# Patient Record
Sex: FEMALE | Race: BLACK OR AFRICAN AMERICAN | Employment: UNEMPLOYED | URBAN - METROPOLITAN AREA
[De-identification: names, ages, dates, MRNs, and addresses within clinical notes are randomized per-mention and may not be internally consistent; named-entity substitution may affect disease eponyms.]

---

## 2017-04-03 ENCOUNTER — HOSPITAL ENCOUNTER (EMERGENCY)
Age: 52
Discharge: HOME | End: 2017-04-03
Attending: EMERGENCY MEDICINE
Payer: SELF-PAY

## 2017-04-03 ENCOUNTER — APPOINTMENT (OUTPATIENT)
Dept: GENERAL RADIOLOGY | Age: 52
End: 2017-04-03
Attending: EMERGENCY MEDICINE
Payer: SELF-PAY

## 2017-04-03 VITALS
RESPIRATION RATE: 16 BRPM | BODY MASS INDEX: 33.29 KG/M2 | WEIGHT: 195 LBS | OXYGEN SATURATION: 97 % | DIASTOLIC BLOOD PRESSURE: 69 MMHG | HEIGHT: 64 IN | HEART RATE: 75 BPM | SYSTOLIC BLOOD PRESSURE: 113 MMHG | TEMPERATURE: 98.1 F

## 2017-04-03 DIAGNOSIS — R19.7 DIARRHEA, UNSPECIFIED TYPE: ICD-10-CM

## 2017-04-03 DIAGNOSIS — R10.13 ABDOMINAL PAIN, EPIGASTRIC: Primary | ICD-10-CM

## 2017-04-03 DIAGNOSIS — R11.2 NON-INTRACTABLE VOMITING WITH NAUSEA, UNSPECIFIED VOMITING TYPE: ICD-10-CM

## 2017-04-03 LAB
ALBUMIN SERPL BCP-MCNC: 3.4 G/DL (ref 3.4–5)
ALBUMIN/GLOB SERPL: 0.9 {RATIO} (ref 0.8–1.7)
ALP SERPL-CCNC: 68 U/L (ref 45–117)
ALT SERPL-CCNC: 11 U/L (ref 13–56)
ANION GAP BLD CALC-SCNC: 6 MMOL/L (ref 3–18)
AST SERPL W P-5'-P-CCNC: 6 U/L (ref 15–37)
ATRIAL RATE: 81 BPM
BASOPHILS # BLD AUTO: 0 K/UL (ref 0–0.1)
BASOPHILS # BLD: 0 % (ref 0–2)
BILIRUB SERPL-MCNC: 0.4 MG/DL (ref 0.2–1)
BUN SERPL-MCNC: 6 MG/DL (ref 7–18)
BUN/CREAT SERPL: 10 (ref 12–20)
CALCIUM SERPL-MCNC: 8.6 MG/DL (ref 8.5–10.1)
CALCULATED P AXIS, ECG09: 50 DEGREES
CALCULATED R AXIS, ECG10: 29 DEGREES
CALCULATED T AXIS, ECG11: 33 DEGREES
CHLORIDE SERPL-SCNC: 110 MMOL/L (ref 100–108)
CK MB CFR SERPL CALC: NORMAL % (ref 0–4)
CK MB SERPL-MCNC: <1 NG/ML (ref 5–25)
CK SERPL-CCNC: 50 U/L (ref 26–192)
CO2 SERPL-SCNC: 26 MMOL/L (ref 21–32)
CREAT SERPL-MCNC: 0.62 MG/DL (ref 0.6–1.3)
DIAGNOSIS, 93000: NORMAL
DIFFERENTIAL METHOD BLD: ABNORMAL
EOSINOPHIL # BLD: 0.1 K/UL (ref 0–0.4)
EOSINOPHIL NFR BLD: 1 % (ref 0–5)
ERYTHROCYTE [DISTWIDTH] IN BLOOD BY AUTOMATED COUNT: 14.5 % (ref 11.6–14.5)
GLOBULIN SER CALC-MCNC: 3.8 G/DL (ref 2–4)
GLUCOSE SERPL-MCNC: 111 MG/DL (ref 74–99)
HCT VFR BLD AUTO: 40.3 % (ref 35–45)
HGB BLD-MCNC: 13.4 G/DL (ref 12–16)
LIPASE SERPL-CCNC: 117 U/L (ref 73–393)
LYMPHOCYTES # BLD AUTO: 15 % (ref 21–52)
LYMPHOCYTES # BLD: 1.4 K/UL (ref 0.9–3.6)
MCH RBC QN AUTO: 25.9 PG (ref 24–34)
MCHC RBC AUTO-ENTMCNC: 33.3 G/DL (ref 31–37)
MCV RBC AUTO: 77.9 FL (ref 74–97)
MONOCYTES # BLD: 0.5 K/UL (ref 0.05–1.2)
MONOCYTES NFR BLD AUTO: 5 % (ref 3–10)
NEUTS SEG # BLD: 7.5 K/UL (ref 1.8–8)
NEUTS SEG NFR BLD AUTO: 79 % (ref 40–73)
P-R INTERVAL, ECG05: 126 MS
PLATELET # BLD AUTO: 297 K/UL (ref 135–420)
PMV BLD AUTO: 11.6 FL (ref 9.2–11.8)
POTASSIUM SERPL-SCNC: 3.7 MMOL/L (ref 3.5–5.5)
PROT SERPL-MCNC: 7.2 G/DL (ref 6.4–8.2)
Q-T INTERVAL, ECG07: 386 MS
QRS DURATION, ECG06: 84 MS
QTC CALCULATION (BEZET), ECG08: 448 MS
RBC # BLD AUTO: 5.17 M/UL (ref 4.2–5.3)
SODIUM SERPL-SCNC: 142 MMOL/L (ref 136–145)
TROPONIN I SERPL-MCNC: <0.02 NG/ML (ref 0–0.04)
VENTRICULAR RATE, ECG03: 81 BPM
WBC # BLD AUTO: 9.5 K/UL (ref 4.6–13.2)

## 2017-04-03 PROCEDURE — 71010 XR CHEST PORT: CPT

## 2017-04-03 PROCEDURE — 93005 ELECTROCARDIOGRAM TRACING: CPT

## 2017-04-03 PROCEDURE — 82550 ASSAY OF CK (CPK): CPT | Performed by: EMERGENCY MEDICINE

## 2017-04-03 PROCEDURE — 83690 ASSAY OF LIPASE: CPT | Performed by: EMERGENCY MEDICINE

## 2017-04-03 PROCEDURE — 80053 COMPREHEN METABOLIC PANEL: CPT | Performed by: EMERGENCY MEDICINE

## 2017-04-03 PROCEDURE — 99285 EMERGENCY DEPT VISIT HI MDM: CPT

## 2017-04-03 PROCEDURE — 85025 COMPLETE CBC W/AUTO DIFF WBC: CPT | Performed by: EMERGENCY MEDICINE

## 2017-04-03 NOTE — ED NOTES

## 2017-04-03 NOTE — ED NOTES
Hourly rounding complete. Safety  Pt resting   [ x ]  On stretcher with side rails up and bed in locked position, call bell within reach  [  ]  Sitting in chair with casters locked, call bell within reach    Toileting  [ x ] pt denies need to use bathroom  [  ] pt assisted to bathroom  [  ] pt assisted with bedpan  [  ] pt independent to bathroom as needed    Ongoing Plan of Care  Plan of care and expected time for test and results reviewed with pt.     Pain Management / Comfort  [  ] dimmed lights  [  ] warm blanket provided  [  ] pain assessed  [ x ] monitor alarms reviewed

## 2017-04-03 NOTE — ED NOTES
Hourly rounding complete. Safety  Pt resting   x[  ]  On stretcher with side rails up and bed in locked position, call bell within reach  [  ]  Sitting in chair with casters locked, call bell within reach    Toileting  [ x ] pt denies need to use bathroom  [  ] pt assisted to bathroom  [  ] pt assisted with bedpan  [  ] pt independent to bathroom as needed    Ongoing Plan of Care  Plan of care and expected time for test and results reviewed with pt.     Pain Management / Comfort  [  ] dimmed lights  [  ] warm blanket provided  [  ] pain assessed  [ x monitor alarms reviewed

## 2017-04-03 NOTE — DISCHARGE INSTRUCTIONS

## 2017-04-03 NOTE — ED TRIAGE NOTES
Patient reports that she had a cabbage and turkey dinner about 5 pm and started throwing up shortly after she ate she reported abdominal pain that started some time after that but it has gotten progressively worse

## 2017-04-03 NOTE — ED NOTES
Reviewed DC instructions with patient. Pt verbalized an understanding. Pt instructed to follow up with PCP this week. Pt signed paper DC instructions; RN placed in scan bin. RN used wheelchair to take patient to from lobby. Pt was taken home by her . Pt left ED with no distress noted; pt left ED with all belongings.

## 2017-04-03 NOTE — ED NOTES
Pt c/o of epigastric pain that started last night around 1800 after eating dinner. Pt stating she has had several episodes of diarrhea and vomiting. Pt stable with no c/o of SOB or chest pain. Pt is AOX4.

## 2017-04-03 NOTE — ED PROVIDER NOTES
HPI Comments: 4:25 AM Lamine Roldan is a 47 y.o. female who presents to the ED c/o epigastric pain that onset at approximately 6 PM yesterday after eating dinner. Patient states that she ate cabbage and turkey for dinner, and 15 minutes later she began to experience abdominal pain along with N/V/D. Mentions that she currently feels better, but the pain is exacerbated with cough. \"Every time I cough, it feels like it's pulling\". No further complaints at this time. The history is provided by the patient. Past Medical History:   Diagnosis Date    Bronchitis     Bronchitis     Obesity     Respiratory abnormalities     Sickle cell trait ([address]        Past Surgical History:   Procedure Laterality Date    HX CHOLECYSTECTOMY           History reviewed. No pertinent family history. Social History     Social History    Marital status: SINGLE     Spouse name: N/A    Number of children: N/A    Years of education: N/A     Occupational History    Not on file. Social History Main Topics    Smoking status: Current Every Day Smoker     Packs/day: 0.20     Years: 15.00    Smokeless tobacco: Never Used    Alcohol use No    Drug use: No    Sexual activity: Yes     Partners: Male     Birth control/ protection: None     Other Topics Concern    Not on file     Social History Narrative         ALLERGIES: Review of patient's allergies indicates no known allergies. Review of Systems   Respiratory: Positive for cough. Gastrointestinal: Positive for abdominal pain, diarrhea, nausea and vomiting. All other systems reviewed and are negative. Vitals:    04/03/17 0315 04/03/17 0330 04/03/17 0345 04/03/17 0400   BP:   120/72 (!) 121/91   Pulse: 75 82 79 78   Resp: 15 24 17 18   Temp:       SpO2: 100% 97% 98% 100%   Weight:       Height:                Physical Exam   Constitutional: She is oriented to person, place, and time. She appears well-developed. HENT:   Head: Normocephalic and atraumatic. Eyes: EOM are normal. Pupils are equal, round, and reactive to light. Neck: Normal range of motion. Neck supple. Cardiovascular: Normal rate, regular rhythm and normal heart sounds. Exam reveals no friction rub. No murmur heard. Pulmonary/Chest: Effort normal and breath sounds normal. No respiratory distress. She has no wheezes. Abdominal: Soft. She exhibits no distension. There is tenderness. There is no rebound and no guarding. Epigastric tenderness     Musculoskeletal: Normal range of motion. Neurological: She is alert and oriented to person, place, and time. Skin: Skin is warm and dry. Psychiatric: She has a normal mood and affect. Her behavior is normal. Thought content normal.        MDM  Number of Diagnoses or Management Options  Diagnosis management comments:  EKG: Sinus at 81 normal axis normal intervals no ST elevation or depression or hypertrophy. 67-year-old female presents with epigastric pain and vomiting after eating dinner. No chest pain whatsoever she does note some diarrhea. Pain is improved significantly although she is  to the touch over the epigastric region. We will check a CMP lipase and CBC all normal likely discharge. I do not think she needs repeat troponins based on her exam    Labs wnl   cxr napd;   N/v/d after eating; feeling better will d/c    ED Course       Procedures      SCRIBE ATTESTATION STATEMENT  Documented by: Jemima wright for, and in the presence of, Ricarda Bass MD     Signed by: Allegra Ayoub, 4/3/2017, 4:27 AM    PROVIDER ATTESTATION STATEMENT  I personally performed the services described in the documentation, reviewed the documentation, as recorded by the scribe in my presence, and it accurately and completely records my words and actions.   Ricarda Bass MD

## 2017-04-03 NOTE — ED NOTES
Hourly rounding complete. Safety  Pt resting   [x ]  On stretcher with side rails up and bed in locked position, call bell within reach  [  ]  Sitting in chair with casters locked, call bell within reach    Toileting  [x  ] pt denies need to use bathroom  [  ] pt assisted to bathroom  [  ] pt assisted with bedpan  [  ] pt independent to bathroom as needed    Ongoing Plan of Care  Plan of care and expected time for test and results reviewed with pt.     Pain Management / Comfort  [  ] dimmed lights  [  ] warm blanket provided  [  ] pain assessed  [x  ] monitor alarms reviewed

## 2022-01-21 ENCOUNTER — APPOINTMENT (OUTPATIENT)
Dept: CT IMAGING | Age: 57
End: 2022-01-21
Attending: STUDENT IN AN ORGANIZED HEALTH CARE EDUCATION/TRAINING PROGRAM

## 2022-01-21 ENCOUNTER — HOSPITAL ENCOUNTER (EMERGENCY)
Age: 57
Discharge: HOME | End: 2022-01-21
Attending: STUDENT IN AN ORGANIZED HEALTH CARE EDUCATION/TRAINING PROGRAM

## 2022-01-21 VITALS
SYSTOLIC BLOOD PRESSURE: 155 MMHG | BODY MASS INDEX: 30.06 KG/M2 | OXYGEN SATURATION: 98 % | DIASTOLIC BLOOD PRESSURE: 106 MMHG | HEIGHT: 70 IN | RESPIRATION RATE: 20 BRPM | WEIGHT: 210 LBS | TEMPERATURE: 98.8 F | HEART RATE: 86 BPM

## 2022-01-21 DIAGNOSIS — L03.211 CELLULITIS OF FACE: Primary | ICD-10-CM

## 2022-01-21 DIAGNOSIS — K08.89 PAIN, DENTAL: ICD-10-CM

## 2022-01-21 LAB
ANION GAP SERPL CALC-SCNC: 5 MMOL/L (ref 3–18)
BASOPHILS # BLD: 0.1 K/UL (ref 0–0.1)
BASOPHILS NFR BLD: 1 % (ref 0–2)
BUN SERPL-MCNC: 5 MG/DL (ref 7–18)
BUN/CREAT SERPL: 8 (ref 12–20)
CALCIUM SERPL-MCNC: 8.6 MG/DL (ref 8.5–10.1)
CHLORIDE SERPL-SCNC: 111 MMOL/L (ref 100–111)
CO2 SERPL-SCNC: 27 MMOL/L (ref 21–32)
CREAT SERPL-MCNC: 0.62 MG/DL (ref 0.6–1.3)
DIFFERENTIAL METHOD BLD: ABNORMAL
EOSINOPHIL # BLD: 0.7 K/UL (ref 0–0.4)
EOSINOPHIL NFR BLD: 6 % (ref 0–5)
ERYTHROCYTE [DISTWIDTH] IN BLOOD BY AUTOMATED COUNT: 14.5 % (ref 11.6–14.5)
GLUCOSE SERPL-MCNC: 98 MG/DL (ref 74–99)
HCT VFR BLD AUTO: 39.7 % (ref 35–45)
HGB BLD-MCNC: 13 G/DL (ref 12–16)
IMM GRANULOCYTES # BLD AUTO: 0 K/UL (ref 0–0.04)
IMM GRANULOCYTES NFR BLD AUTO: 0 % (ref 0–0.5)
LYMPHOCYTES # BLD: 2.3 K/UL (ref 0.9–3.6)
LYMPHOCYTES NFR BLD: 19 % (ref 21–52)
MCH RBC QN AUTO: 25.8 PG (ref 24–34)
MCHC RBC AUTO-ENTMCNC: 32.7 G/DL (ref 31–37)
MCV RBC AUTO: 78.9 FL (ref 78–100)
MONOCYTES # BLD: 0.9 K/UL (ref 0.05–1.2)
MONOCYTES NFR BLD: 8 % (ref 3–10)
NEUTS SEG # BLD: 7.9 K/UL (ref 1.8–8)
NEUTS SEG NFR BLD: 67 % (ref 40–73)
NRBC # BLD: 0 K/UL (ref 0–0.01)
NRBC BLD-RTO: 0 PER 100 WBC
PLATELET # BLD AUTO: 294 K/UL (ref 135–420)
PMV BLD AUTO: 11.2 FL (ref 9.2–11.8)
POTASSIUM SERPL-SCNC: 3.1 MMOL/L (ref 3.5–5.5)
RBC # BLD AUTO: 5.03 M/UL (ref 4.2–5.3)
SODIUM SERPL-SCNC: 143 MMOL/L (ref 136–145)
WBC # BLD AUTO: 11.9 K/UL (ref 4.6–13.2)

## 2022-01-21 PROCEDURE — 74011000250 HC RX REV CODE- 250: Performed by: STUDENT IN AN ORGANIZED HEALTH CARE EDUCATION/TRAINING PROGRAM

## 2022-01-21 PROCEDURE — 96365 THER/PROPH/DIAG IV INF INIT: CPT

## 2022-01-21 PROCEDURE — 70487 CT MAXILLOFACIAL W/DYE: CPT

## 2022-01-21 PROCEDURE — 80048 BASIC METABOLIC PNL TOTAL CA: CPT

## 2022-01-21 PROCEDURE — 74011250636 HC RX REV CODE- 250/636: Performed by: STUDENT IN AN ORGANIZED HEALTH CARE EDUCATION/TRAINING PROGRAM

## 2022-01-21 PROCEDURE — 74011000636 HC RX REV CODE- 636: Performed by: STUDENT IN AN ORGANIZED HEALTH CARE EDUCATION/TRAINING PROGRAM

## 2022-01-21 PROCEDURE — 99283 EMERGENCY DEPT VISIT LOW MDM: CPT

## 2022-01-21 PROCEDURE — 74011000258 HC RX REV CODE- 258: Performed by: STUDENT IN AN ORGANIZED HEALTH CARE EDUCATION/TRAINING PROGRAM

## 2022-01-21 PROCEDURE — 85025 COMPLETE CBC W/AUTO DIFF WBC: CPT

## 2022-01-21 PROCEDURE — 96375 TX/PRO/DX INJ NEW DRUG ADDON: CPT

## 2022-01-21 RX ORDER — CHLORHEXIDINE GLUCONATE 1.2 MG/ML
15 RINSE ORAL EVERY 12 HOURS
Qty: 420 ML | Refills: 0 | Status: SHIPPED | OUTPATIENT
Start: 2022-01-21 | End: 2022-02-04

## 2022-01-21 RX ORDER — MORPHINE SULFATE 4 MG/ML
4 INJECTION INTRAVENOUS
Status: COMPLETED | OUTPATIENT
Start: 2022-01-21 | End: 2022-01-21

## 2022-01-21 RX ORDER — OXYCODONE AND ACETAMINOPHEN 5; 325 MG/1; MG/1
1 TABLET ORAL ONCE
Status: DISCONTINUED | OUTPATIENT
Start: 2022-01-21 | End: 2022-01-21 | Stop reason: HOSPADM

## 2022-01-21 RX ORDER — CLINDAMYCIN HYDROCHLORIDE 300 MG/1
300 CAPSULE ORAL 4 TIMES DAILY
Qty: 28 CAPSULE | Refills: 0 | Status: SHIPPED | OUTPATIENT
Start: 2022-01-21 | End: 2022-01-28

## 2022-01-21 RX ADMIN — CLINDAMYCIN PHOSPHATE 600 MG: 150 INJECTION, SOLUTION INTRAVENOUS at 06:38

## 2022-01-21 RX ADMIN — IOPAMIDOL 80 ML: 612 INJECTION, SOLUTION INTRAVENOUS at 10:36

## 2022-01-21 RX ADMIN — MORPHINE SULFATE 4 MG: 4 INJECTION INTRAVENOUS at 06:02

## 2022-01-21 NOTE — ED NOTES
Lab contacted to see where results of CMP are- I was told that blood was hemolyzed and needs to be recollected. When RN to room, patient screaming \"I need something for pain\". MD notified et states will put in order.

## 2022-01-21 NOTE — ED NOTES
Dr. Lilly Mins Taking over patient care. Patient CT scan shows left-sided facial cellulitis with some underlying gingival thickening and buccal mucosa thickening without any distinct fluid collection. Will discharge patient home with return precautions and follow-up recommendations. Will provide patient with clindamycin upon discharge for further treatment and recommend patient follow-up with dentistry for further treatment of her symptoms. Patient verbalized understanding is without any further questions.

## 2022-01-21 NOTE — ED PROVIDER NOTES
EMERGENCY DEPARTMENT HISTORY AND PHYSICAL EXAM    I have evaluated the patient at 5:29 AM      Date: 1/21/2022  Patient Name: Collins Oliveira    History of Presenting Illness     Chief Complaint   Patient presents with    Dental Pain         History Provided By: Patient  Location/Duration/Severity/Modifying factors   This is a 54-year-old female presenting to the emergency department for evaluation of left maxillary dental pain facial swelling for the past 2 days. Reports that she was eating a pork and hit reports that in between her teeth. She had flossed but since then has been experiencing pain that has only been worsening with associated facial swelling. She has been trying Advil at home without relief. Denies having any dental work done. .  She denies having any fevers or chills, trouble swallowing or breathing          PCP: None    Current Facility-Administered Medications   Medication Dose Route Frequency Provider Last Rate Last Admin    morphine injection 4 mg  4 mg IntraVENous NOW Bearl [place], DO         Current Outpatient Medications   Medication Sig Dispense Refill    guaiFENesin-codeine (CHERATUSSIN AC) 100-10 mg/5 mL solution Take 5 mL by mouth four (4) times daily as needed for Cough. Max Daily Amount: 20 mL. 118 mL 0    albuterol (PROVENTIL HFA, VENTOLIN HFA) 90 mcg/actuation inhaler 2 puffs every 4 hours prn 1 Inhaler 2       Past History     Past Medical History:  Past Medical History:   Diagnosis Date    Bronchitis     Bronchitis     Obesity     Respiratory abnormalities     Sickle cell trait (HCC)        Past Surgical History:  Past Surgical History:   Procedure Laterality Date    HX CHOLECYSTECTOMY         Family History:  No family history on file.     Social History:  Social History     Tobacco Use    Smoking status: Current Every Day Smoker     Packs/day: 0.20     Years: 15.00     Pack years: 3.00    Smokeless tobacco: Never Used   Substance Use Topics    Alcohol use: No    Drug use: No       Allergies: Allergies   Allergen Reactions    Ibuprofen Nausea Only         Review of Systems       Review of Systems   Constitutional: Negative for activity change, chills, diaphoresis, fatigue and fever. HENT: Positive for dental problem. Negative for sore throat, trouble swallowing and voice change. Left sided facial swelling   Eyes: Negative for photophobia, pain and visual disturbance. Respiratory: Negative for cough, chest tightness, shortness of breath, wheezing and stridor. Cardiovascular: Negative for chest pain and palpitations. Gastrointestinal: Negative for abdominal distention, abdominal pain, constipation, diarrhea, nausea and vomiting. Musculoskeletal: Negative for back pain, joint swelling and myalgias. Skin: Negative for rash and wound. Neurological: Negative for dizziness, weakness, numbness and headaches. Psychiatric/Behavioral: Negative for agitation. The patient is not nervous/anxious. Physical Exam     Visit Vitals  BP (!) 155/106   Pulse 86   Temp 98.8 °F (37.1 °C)   Resp 20   Ht 5' 9.6\" (1.768 m)   Wt 95.3 kg (210 lb)   SpO2 98%   BMI 30.48 kg/m²         Physical Exam  Constitutional:       General: She is not in acute distress. Appearance: She is not toxic-appearing. HENT:      Head: Atraumatic. Right Ear: Tympanic membrane normal.      Left Ear: Tympanic membrane normal.      Mouth/Throat:      Mouth: Mucous membranes are moist.      Comments: Dental caries seen in the left maxillary premolar. No left-sided facial swelling over the maxilla/buccal area with tenderness to palpation  Eyes:      Extraocular Movements: Extraocular movements intact. Pupils: Pupils are equal, round, and reactive to light. Cardiovascular:      Rate and Rhythm: Normal rate and regular rhythm. Heart sounds: Normal heart sounds. No murmur heard. No friction rub. No gallop.     Pulmonary:      Effort: Pulmonary effort is normal.      Breath sounds: Normal breath sounds. Abdominal:      General: There is no distension. Palpations: Abdomen is soft. There is no mass. Tenderness: There is no abdominal tenderness. There is no guarding. Hernia: No hernia is present. Musculoskeletal:         General: No swelling, tenderness or deformity. Cervical back: Normal range of motion and neck supple. Skin:     General: Skin is warm and dry. Findings: No rash. Neurological:      General: No focal deficit present. Mental Status: She is alert and oriented to person, place, and time. Psychiatric:         Mood and Affect: Mood normal.           Diagnostic Study Results     Labs -  No results found for this or any previous visit (from the past 12 hour(s)). Radiologic Studies -   CT MAXILLOFACIAL W CONT    (Results Pending)         Medical Decision Making   I am the first provider for this patient. I reviewed the vital signs, available nursing notes, past medical history, past surgical history, family history and social history. Vital Signs-Reviewed the patient's vital signs. Records Reviewed: Nursing Notes (Time of Review: 5:29 AM)    ED Course: Progress Notes, Reevaluation, and Consults:         Provider Notes (Medical Decision Making):   MDM  Number of Diagnoses or Management Options  Dental abscess  Diagnosis management comments: 69-year-old female presenting with left-sided dental pain and facial swelling. She appears in discomfort but with stable vital signs. She is afebrile. Uvula is midline and there is no oropharyngeal swelling appreciated. Will obtain CT of the face in addition to screening blood work. Will give analgesia. Likely will require more urgent dental intervention with possibility of IV antibiotic treatment. Patient care signed out to dayshift ED attending physician. Diagnosis     Clinical Impression:   1.  Dental abscess        Disposition: tbd    Follow-up Information    None Patient's Medications   Start Taking    No medications on file   Continue Taking    ALBUTEROL (PROVENTIL HFA, VENTOLIN HFA) 90 MCG/ACTUATION INHALER    2 puffs every 4 hours prn    GUAIFENESIN-CODEINE (CHERATUSSIN AC) 100-10 MG/5 ML SOLUTION    Take 5 mL by mouth four (4) times daily as needed for Cough. Max Daily Amount: 20 mL. These Medications have changed    No medications on file   Stop Taking    No medications on file     Disclaimer: Sections of this note are dictated using utilizing voice recognition software. Minor typographical errors may be present. If questions arise, please do not hesitate to contact me or call our department.

## 2022-01-21 NOTE — ED TRIAGE NOTES
Left dental pain with facial swelling x 2 days. States she thinks she got a piece of meat stuck between teeth about a week ago et trying to remove with tooth pick since.

## 2022-07-05 NOTE — ED NOTES
LMTCB Respirations even and unlabored. Resting in recliner with eyes closed. She does report that there has been no improvement in pain- \"just settled down a bit\". Patient reassured.

## 2022-08-04 ENCOUNTER — APPOINTMENT (OUTPATIENT)
Dept: ULTRASOUND IMAGING | Age: 57
End: 2022-08-04
Attending: EMERGENCY MEDICINE
Payer: MEDICAID

## 2022-08-04 ENCOUNTER — APPOINTMENT (OUTPATIENT)
Dept: CT IMAGING | Age: 57
End: 2022-08-04
Attending: EMERGENCY MEDICINE
Payer: MEDICAID

## 2022-08-04 ENCOUNTER — HOSPITAL ENCOUNTER (EMERGENCY)
Age: 57
Discharge: HOME | End: 2022-08-04
Attending: EMERGENCY MEDICINE
Payer: MEDICAID

## 2022-08-04 VITALS
SYSTOLIC BLOOD PRESSURE: 117 MMHG | OXYGEN SATURATION: 100 % | RESPIRATION RATE: 18 BRPM | HEART RATE: 87 BPM | TEMPERATURE: 98.1 F | DIASTOLIC BLOOD PRESSURE: 82 MMHG

## 2022-08-04 DIAGNOSIS — Z01.411 ABNORMAL PELVIC EXAM: Primary | ICD-10-CM

## 2022-08-04 DIAGNOSIS — R93.89 ABNORMAL ULTRASOUND: ICD-10-CM

## 2022-08-04 PROCEDURE — 99284 EMERGENCY DEPT VISIT MOD MDM: CPT

## 2022-08-04 PROCEDURE — 76830 TRANSVAGINAL US NON-OB: CPT

## 2022-08-04 PROCEDURE — 74176 CT ABD & PELVIS W/O CONTRAST: CPT

## 2022-08-04 RX ORDER — CONJUGATED ESTROGENS 0.62 MG/G
0.5 CREAM VAGINAL DAILY
Qty: 30 G | Refills: 0 | Status: SHIPPED | OUTPATIENT
Start: 2022-08-04

## 2022-08-04 NOTE — ED PROVIDER NOTES
EMERGENCY DEPARTMENT HISTORY AND PHYSICAL EXAM    Date: 8/4/2022  Patient Name: John Funes    History of Presenting Illness     Chief Complaint   Patient presents with    Vaginal Pain         History Provided By: Patient    Additional History (Context): John Funes is a 61 y.o. female with obesity and respiratory bronchitis  who presents with a complaint of for the past week or so a sense(7-10 days) that something is occasionally protruding from her vaginal area and it is uncomfortable. Has not been recently sexually active. denies any vaginal bleeding or discharge. Denies any foreign body. PCP: None    Current Outpatient Medications   Medication Sig Dispense Refill    conjugated estrogens (Premarin) 0.625 mg/gram vaginal cream Insert 0.5 g into vagina in the morning. 30 g 0    cetirizine (ZyrTEC) 10 mg tablet Take 1 Tablet by mouth two (2) times a day. 7 Tablet 0    albuterol (PROVENTIL HFA, VENTOLIN HFA) 90 mcg/actuation inhaler 2 puffs every 4 hours prn 1 Inhaler 2       Past History     Past Medical History:  Past Medical History:   Diagnosis Date    Bronchitis     Bronchitis     Obesity     Respiratory abnormalities     Sickle cell trait (HCC)        Past Surgical History:  Past Surgical History:   Procedure Laterality Date    HX CHOLECYSTECTOMY         Family History:  No family history on file. Social History:  Social History     Tobacco Use    Smoking status: Every Day     Packs/day: 0.20     Years: 15.00     Pack years: 3.00     Types: Cigarettes    Smokeless tobacco: Never   Substance Use Topics    Alcohol use: No    Drug use: No       Allergies: Allergies   Allergen Reactions    Ibuprofen Nausea Only         Review of Systems   Review of Systems   Constitutional:  Negative for fever. Genitourinary:  Positive for vaginal pain. Negative for pelvic pain, vaginal bleeding and vaginal discharge.    All Other Systems Negative  Physical Exam     Vitals:    08/04/22 1411   BP: 117/82   Pulse: 87   Resp: 18   Temp: 98.1 °F (36.7 °C)   SpO2: 100%     Physical Exam  Vitals and nursing note reviewed. Constitutional:       Appearance: She is well-developed. HENT:      Head: Normocephalic and atraumatic. Right Ear: External ear normal.      Left Ear: External ear normal.      Nose: Nose normal.   Eyes:      Conjunctiva/sclera: Conjunctivae normal.      Pupils: Pupils are equal, round, and reactive to light. Neck:      Vascular: No JVD. Trachea: No tracheal deviation. Cardiovascular:      Rate and Rhythm: Normal rate and regular rhythm. Heart sounds: Normal heart sounds. No murmur heard. No friction rub. No gallop. Pulmonary:      Effort: Pulmonary effort is normal. No respiratory distress. Breath sounds: Normal breath sounds. No wheezing or rales. Abdominal:      General: Bowel sounds are normal. There is no distension. Palpations: Abdomen is soft. There is no mass. Tenderness: There is no abdominal tenderness. There is no guarding or rebound. Genitourinary:     Comments: Patient extremely reserved on physical exam.  Appears to be prolapsed cervix protruding in the vaginal vault, stage 1. Musculoskeletal:         General: No tenderness. Normal range of motion. Cervical back: Normal range of motion and neck supple. Skin:     General: Skin is warm and dry. Findings: No rash. Neurological:      Mental Status: She is alert and oriented to person, place, and time. Cranial Nerves: No cranial nerve deficit. Deep Tendon Reflexes: Reflexes are normal and symmetric. Psychiatric:         Behavior: Behavior normal.            Diagnostic Study Results     Labs -   No results found for this or any previous visit (from the past 12 hour(s)). Radiologic Studies -   US TRANSVAGINAL W DOPPLER   Final Result   1. Trace nonspecific free fluid in the retrouterine space, which may be   physiologic.    2.  A 2.3 cm and lesion at the junction of the fundal myometrium and   endometrium, favor submucosal leiomyoma although endometrial polyp is also   possible. Sonohysterogram or MRI pelvis could be considered to further   characterize. 3.  The patient reports a bump of the vagina. No dedicated imaging of this area   was performed. CT ABD PELV WO CONT   Final Result      Approximately 6 mm calcification near the proximal right ureter. However, there   is no significant right-sided hydronephrosis. Uncertain whether this represents   a nonobstructing ureteral stone or vascular phlebolith. Consider correlation   with urinalysis for signs of hematuria. No other hydronephrosis or urinary tract calcification identified. Colonic diverticulosis without acute diverticulitis. Normal appendix. Prior cholecystectomy. CT Results  (Last 48 hours)      None          CXR Results  (Last 48 hours)      None              Medical Decision Making   I am the first provider for this patient. I reviewed the vital signs, available nursing notes, past medical history, past surgical history, family history and social history. Vital Signs-Reviewed the patient's vital signs. Records Reviewed: Nursing Notes    Procedures:  Procedures    Provider Notes (Medical Decision Making): Patient and her abnormal ultrasound findings for masses versus fibroids and uterine prolapse and need for close GYN follow-up for further resolution and treatment of her issues. Patient understands preliminary findings and that this is not definitive as she will need additional testing and close follow-up. MED RECONCILIATION:  No current facility-administered medications for this encounter. Current Outpatient Medications   Medication Sig    conjugated estrogens (Premarin) 0.625 mg/gram vaginal cream Insert 0.5 g into vagina in the morning. cetirizine (ZyrTEC) 10 mg tablet Take 1 Tablet by mouth two (2) times a day.     albuterol (PROVENTIL HFA, VENTOLIN HFA) 90 mcg/actuation inhaler 2 puffs every 4 hours prn       Disposition:  home    DISCHARGE NOTE:   5:16 PM    Pt has been reexamined. Patient has no new complaints, changes, or physical findings. Care plan outlined and precautions discussed. Results of exam, CT, US were reviewed with the patient. All medications were reviewed with the patient; will d/c home with premarin. All of pt's questions and concerns were addressed. Patient was instructed and agrees to follow up with GYN, as well as to return to the ED upon further deterioration. Patient is ready to go home. Follow-up Information       Follow up With Specialties Details Why Contact Info    [facility], [address] [facility] Gynecology Schedule an appointment as soon as possible for a visit in 1 day  [address] [facility], [address] [place] [address]    [id number] [place] [address] EMERGENCY DEPT Emergency Medicine  If symptoms worsen return immediately [address] [facility]  [phone]            Discharge Medication List as of 8/4/2022  5:15 PM              Diagnosis     Clinical Impression:   1. Abnormal pelvic exam    2.  Abnormal ultrasound

## 2022-08-16 ENCOUNTER — HOSPITAL ENCOUNTER (EMERGENCY)
Age: 57
Discharge: HOME | End: 2022-08-16
Attending: EMERGENCY MEDICINE
Payer: MEDICAID

## 2022-08-16 VITALS
RESPIRATION RATE: 18 BRPM | OXYGEN SATURATION: 95 % | DIASTOLIC BLOOD PRESSURE: 96 MMHG | SYSTOLIC BLOOD PRESSURE: 122 MMHG | TEMPERATURE: 98.2 F | HEART RATE: 82 BPM

## 2022-08-16 DIAGNOSIS — T63.441A BEE STING, ACCIDENTAL OR UNINTENTIONAL, INITIAL ENCOUNTER: Primary | ICD-10-CM

## 2022-08-16 PROCEDURE — 99283 EMERGENCY DEPT VISIT LOW MDM: CPT

## 2022-08-16 RX ORDER — DIPHENHYDRAMINE HCL 25 MG
CAPSULE ORAL
Qty: 16 CAPSULE | Refills: 0 | Status: SHIPPED | OUTPATIENT
Start: 2022-08-16 | End: 2022-08-16 | Stop reason: ALTCHOICE

## 2022-08-16 RX ORDER — CETIRIZINE HCL 10 MG
10 TABLET ORAL 2 TIMES DAILY
Qty: 7 TABLET | Refills: 0 | Status: SHIPPED | OUTPATIENT
Start: 2022-08-16

## 2022-08-16 RX ORDER — CETIRIZINE HCL 10 MG
10 TABLET ORAL 2 TIMES DAILY
Qty: 7 TABLET | Refills: 0 | Status: SHIPPED | OUTPATIENT
Start: 2022-08-16 | End: 2022-08-16

## 2022-08-16 RX ORDER — DEXAMETHASONE 2 MG/1
10 TABLET ORAL
Qty: 5 TABLET | Refills: 0 | Status: SHIPPED | OUTPATIENT
Start: 2022-08-16 | End: 2022-08-16

## 2022-08-16 NOTE — ED TRIAGE NOTES
Client report being stung by bee yesterday on top of r. Eye lid. Noted swelling to upper eye lid and socket in general. Vision normal. NAD. AXOX4.

## 2022-08-16 NOTE — ED PROVIDER NOTES
EMERGENCY DEPARTMENT HISTORY AND PHYSICAL EXAM    12:46 PM      Date: 8/16/2022  Patient Name: Addis Luke    History of Presenting Illness     Chief Complaint   Patient presents with    Bee sting       History Provided By: Patient    Additional History (Context): Addis Luke is a 61 y.o. female with  noted PMH  who presents with c/o R periorbital edema and pain x 2 days after bee sting. Patient notes swelling has slowly reduced. Patient denies fever or chills, rash, sore throat, throat swelling, drooling or stridor. Did not take any medication for the symptoms prior to arrival.  Denies any changes in vision. Denies exacerbating or alleviating factors. PCP: None    Current Outpatient Medications   Medication Sig Dispense Refill    dexAMETHasone (DECADRON) 2 mg tablet Take 5 Tablets by mouth now for 1 dose. 5 Tablet 0    cetirizine (ZyrTEC) 10 mg tablet Take 1 Tablet by mouth two (2) times a day. 7 Tablet 0    conjugated estrogens (Premarin) 0.625 mg/gram vaginal cream Insert 0.5 g into vagina in the morning. 30 g 0    albuterol (PROVENTIL HFA, VENTOLIN HFA) 90 mcg/actuation inhaler 2 puffs every 4 hours prn 1 Inhaler 2       Past History     Past Medical History:  Past Medical History:   Diagnosis Date    Bronchitis     Bronchitis     Obesity     Respiratory abnormalities     Sickle cell trait (HCC)        Past Surgical History:  Past Surgical History:   Procedure Laterality Date    HX CHOLECYSTECTOMY         Family History:  No family history on file. Social History:  Social History     Tobacco Use    Smoking status: Every Day     Packs/day: 0.20     Years: 15.00     Pack years: 3.00     Types: Cigarettes    Smokeless tobacco: Never   Substance Use Topics    Alcohol use: No    Drug use: No       Allergies: Allergies   Allergen Reactions    Ibuprofen Nausea Only         Review of Systems       Review of Systems   Constitutional:  Negative for chills and fever. HENT:  Positive for facial swelling. Eyes:  Positive for pain. Respiratory:  Negative for shortness of breath. Cardiovascular:  Negative for chest pain. Gastrointestinal:  Negative for abdominal pain, nausea and vomiting. Skin:  Negative for rash. Neurological:  Negative for weakness. All other systems reviewed and are negative. Physical Exam   Visit Vitals  BP (!) 122/96 (BP 1 Location: Right lower arm, BP Patient Position: At rest)   Pulse 82   Temp 98.2 °F (36.8 °C)   Resp 18   SpO2 95%         Physical Exam  Vitals and nursing note reviewed. Constitutional:       General: She is not in acute distress. Appearance: Normal appearance. She is well-developed. She is not ill-appearing, toxic-appearing or diaphoretic. HENT:      Head: Normocephalic and atraumatic. Comments: R periorbital edema without erythema/wounds/discoloration     Mouth/Throat:      Mouth: Mucous membranes are moist.      Pharynx: Oropharynx is clear. Uvula midline. Comments: No drooling or stridor, no tongue edema  Cardiovascular:      Rate and Rhythm: Normal rate and regular rhythm. Heart sounds: Normal heart sounds. No murmur heard. No friction rub. No gallop. Pulmonary:      Effort: Pulmonary effort is normal. No respiratory distress. Breath sounds: Normal breath sounds. No wheezing or rales. Musculoskeletal:         General: Normal range of motion. Cervical back: Normal range of motion and neck supple. Skin:     General: Skin is warm. Findings: No rash. Neurological:      Mental Status: She is alert. Diagnostic Study Results     Labs -  No results found for this or any previous visit (from the past 12 hour(s)). Radiologic Studies -   No orders to display         Medical Decision Making   I am the first provider for this patient. I reviewed the vital signs, available nursing notes, past medical history, past surgical history, family history and social history.     Vital Signs-Reviewed the patient's vital signs. Records Reviewed: Nursing Notes and Old Medical Records (Time of Review: 12:46 PM)    ED Course: Progress Notes, Reevaluation, and Consults:  12:46 PM  Reviewed plan with patient. Discussed need for close outpatient follow-up this week for reassessment. Discussed strict return precautions, including increased swelling or any other medical concerns. Dr. Rocha evaluated patient in triage. Provider Notes (Medical Decision Making): 68-year-old female who presents  to the ED due to right periorbital edema and pain x2 days after bee sting. Afebrile, nontoxic-appearing, looks well. No evidence of cellulitis, abscess, foreign body. Patent airway, no drooling or stridor. No rash. Patient is stable for discharge with symptomatic management and close outpatient follow-up for further assessment. Strict return precautions provided. Diagnosis     Clinical Impression:   1. Bee sting, accidental or unintentional, initial encounter        Disposition: home     Follow-up Information       Follow up With Specialties Details Why [address] [facility] EMERGENCY DEPT Emergency Medicine  If symptoms worsen [address] [place] [id number]  Gary 6  Schedule an appointment as soon as possible for a visit   Susan Krueger 3  [address]  [phone]             Patient's Medications   Start Taking    CETIRIZINE (ZYRTEC) 10 MG TABLET    Take 1 Tablet by mouth two (2) times a day. DEXAMETHASONE (DECADRON) 2 MG TABLET    Take 5 Tablets by mouth now for 1 dose. Continue Taking    ALBUTEROL (PROVENTIL HFA, VENTOLIN HFA) 90 MCG/ACTUATION INHALER    2 puffs every 4 hours prn    CONJUGATED ESTROGENS (PREMARIN) 0.625 MG/GRAM VAGINAL CREAM    Insert 0.5 g into vagina in the morning.    These Medications have changed    No medications on file   Stop Taking    ALUMINUM-MAGNESIUM HYDROXIDE 200-200 MG/5 ML SUSP 5 ML, DIPHENHYDRAMINE 12.5 MG/5 ML LIQD 12.5 MG, LIDOCAINE 2 % SOLN 5 ML    5 mL by Swish and Spit route two (2) times a day. Magic mouth wash   Maalox  Lidocaine 2% viscous   Diphenhydramine oral solution     Pharmacy to mix equal portions of ingredients to a total volume as indicated in the dispense amount. GUAIFENESIN-CODEINE (CHERATUSSIN AC) 100-10 MG/5 ML SOLUTION    Take 5 mL by mouth four (4) times daily as needed for Cough. Max Daily Amount: 20 mL. Dictation disclaimer:  Please note that this dictation was completed with [employer], the [employer] voice recognition software. Quite often unanticipated grammatical, syntax, homophones, and other interpretive errors are inadvertently transcribed by the computer software. Please disregard these errors. Please excuse any errors that have escaped final proofreading.

## 2022-08-16 NOTE — Clinical Note
[facility] EMERGENCY DEPT  [address] [address] [phone]    Work/School Note    Date: 8/16/2022    To Whom It May concern:    Hawa Carlisle was seen and treated today in the emergency room by the following provider(s):  Attending Provider: Molly Jimenez MD  Physician Assistant: TESS Goldberg.      Hawa Carlisle is excused from work/school on 08/16/22 and 08/17/22. She is medically clear to return to work/school on 8/18/2022.        Sincerely,          TESS Montelongo

## 2022-08-16 NOTE — Clinical Note
[facility] EMERGENCY DEPT  [address] [id number] [phone]    Work/School Note    Date: 8/16/2022    To Whom It May concern:    Cali Clayton was seen and treated today in the emergency room by the following provider(s):  Attending Provider: Li Marroquin MD  Physician Assistant: TESS Escobedo.      Cali Clayton is excused from work/school on 08/16/22 and 08/17/22. She is medically clear to return to work/school on 8/18/2022.        Sincerely,          TESS [employer] TG/low HDL. Take Krill oil supp. Proteinuria persists. Copy to neph.   Rest O.K.

## 2024-07-24 ENCOUNTER — HOSPITAL ENCOUNTER (EMERGENCY)
Facility: HOSPITAL | Age: 59
Discharge: HOME | End: 2024-07-24
Payer: MEDICAID

## 2024-07-24 VITALS
DIASTOLIC BLOOD PRESSURE: 72 MMHG | TEMPERATURE: 98.4 F | RESPIRATION RATE: 18 BRPM | BODY MASS INDEX: 30.36 KG/M2 | WEIGHT: 165 LBS | HEART RATE: 81 BPM | SYSTOLIC BLOOD PRESSURE: 122 MMHG | OXYGEN SATURATION: 99 % | HEIGHT: 62 IN

## 2024-07-24 DIAGNOSIS — H10.9 BACTERIAL CONJUNCTIVITIS OF LEFT EYE: Primary | ICD-10-CM

## 2024-07-24 PROCEDURE — 99283 EMERGENCY DEPT VISIT LOW MDM: CPT

## 2024-07-24 PROCEDURE — 6370000000 HC RX 637 (ALT 250 FOR IP)

## 2024-07-24 RX ORDER — CETIRIZINE HYDROCHLORIDE 10 MG/1
10 TABLET ORAL DAILY
Qty: 30 TABLET | Refills: 0 | Status: SHIPPED | OUTPATIENT
Start: 2024-07-24

## 2024-07-24 RX ORDER — ERYTHROMYCIN 5 MG/G
0.5 OINTMENT OPHTHALMIC EVERY 6 HOURS
Qty: 1 G | Refills: 0 | Status: SHIPPED | OUTPATIENT
Start: 2024-07-24 | End: 2024-07-29

## 2024-07-24 RX ORDER — TETRACAINE HYDROCHLORIDE 5 MG/ML
1 SOLUTION OPHTHALMIC ONCE
Status: COMPLETED | OUTPATIENT
Start: 2024-07-24 | End: 2024-07-24

## 2024-07-24 RX ADMIN — FLUORESCEIN SODIUM 1 MG: 1 STRIP OPHTHALMIC at 13:47

## 2024-07-24 RX ADMIN — TETRACAINE HYDROCHLORIDE 1 DROP: 5 SOLUTION OPHTHALMIC at 13:45

## 2024-07-24 ASSESSMENT — ENCOUNTER SYMPTOMS
COUGH: 0
CHEST TIGHTNESS: 0
VOMITING: 0
DIARRHEA: 0
SHORTNESS OF BREATH: 0
NAUSEA: 0
EYE DISCHARGE: 1
ABDOMINAL PAIN: 0
CONSTIPATION: 0

## 2024-07-24 ASSESSMENT — TONOMETRY
IOP_HANDHELD: 1
OS_IOP_MMHG: 20

## 2024-07-24 NOTE — DISCHARGE INSTRUCTIONS
Please erythromycin ointment in the left eye every 6 hours for 5 days.  Over the counter allergy medications such as zyrtec can be taken.  Please follow-up with ophthalmology within the next few days in order to be reevaluated.  Contact information for both [facility] or [place] [facility] is attached and they are often able to take same-day appointments.  Return to the ED with any new or worsening symptoms.

## 2024-07-24 NOTE — ED TRIAGE NOTES
Patient states that having watery discharge to both eyes over the past week. Doesn't have a primary doctor. States notes that drainage upon waking every morning. Doesn't take allergy medication.

## 2024-07-24 NOTE — ED PROVIDER NOTES
EMERGENCY DEPARTMENT HISTORY AND PHYSICAL EXAM    1:50 PM      Date: 7/24/2024  Patient Name: Lara Montesinos    History of Presenting Illness     Chief Complaint   Patient presents with    Eye Problem         History Provided By: the patient.     Additional History (Context): Lara Montesinos is a 59 y.o. female with a history of bronchitis and sickle cell trait presenting to the emergency department due to watery discharge from both eyes over the last week.  Patient reports that the left eye is worse than the right.  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  Denies any pain or itching to the eyes.  Has not taken anything for the symptoms.  Has not followed with ophthalmology.  Denies changes in vision.    PCP: No primary care provider on file.    Current Facility-Administered Medications   Medication Dose Route Frequency Provider Last Rate Last Admin    fluorescein ophthalmic strip 1 mg  1 strip Left Eye NOW Laila Arreguin PA-C        tetracaine (TETRAVISC) 0.5 % ophthalmic solution 1 drop  1 drop Left Eye Once Laila Arreguin PA-C         Current Outpatient Medications   Medication Sig Dispense Refill    erythromycin (ROMYCIN) 5 MG/GM ophthalmic ointment Place 0.5 cm into the left eye every 6 hours for 5 days 1 g 0    albuterol sulfate HFA (PROVENTIL;VENTOLIN;PROAIR) 108 (90 Base) MCG/ACT inhaler 2 puffs every 4 hours prn      cetirizine (ZYRTEC) 10 MG tablet Take 10 mg by mouth 2 times daily      estrogens conjugated (PREMARIN) 0.625 MG/GM CREA vaginal cream Place 0.5 g vaginally daily         Past History     Past Medical History:  Past Medical History:   Diagnosis Date    Bronchitis     Bronchitis     Obesity     Respiratory abnormalities     Sickle cell trait (HCC)        Past Surgical History:  Past Surgical History:   Procedure Laterality Date    CHOLECYSTECTOMY         Family History:  No family history on

## 2025-02-09 ENCOUNTER — HOSPITAL ENCOUNTER (EMERGENCY)
Facility: HOSPITAL | Age: 60
Discharge: HOME | End: 2025-02-09
Attending: STUDENT IN AN ORGANIZED HEALTH CARE EDUCATION/TRAINING PROGRAM
Payer: MEDICAID

## 2025-02-09 VITALS
TEMPERATURE: 100.3 F | SYSTOLIC BLOOD PRESSURE: 133 MMHG | OXYGEN SATURATION: 98 % | HEART RATE: 85 BPM | RESPIRATION RATE: 16 BRPM | DIASTOLIC BLOOD PRESSURE: 84 MMHG

## 2025-02-09 DIAGNOSIS — K02.9 DENTAL CARIES: Primary | ICD-10-CM

## 2025-02-09 DIAGNOSIS — K02.9 PAIN DUE TO DENTAL CARIES: ICD-10-CM

## 2025-02-09 DIAGNOSIS — K04.7 DENTAL INFECTION: ICD-10-CM

## 2025-02-09 PROCEDURE — 6370000000 HC RX 637 (ALT 250 FOR IP): Performed by: STUDENT IN AN ORGANIZED HEALTH CARE EDUCATION/TRAINING PROGRAM

## 2025-02-09 PROCEDURE — 99283 EMERGENCY DEPT VISIT LOW MDM: CPT

## 2025-02-09 RX ORDER — CHLORHEXIDINE GLUCONATE ORAL RINSE 1.2 MG/ML
15 SOLUTION DENTAL 2 TIMES DAILY
Qty: 420 ML | Refills: 0 | Status: SHIPPED | OUTPATIENT
Start: 2025-02-09 | End: 2025-02-23

## 2025-02-09 RX ORDER — KETOROLAC TROMETHAMINE 10 MG/1
10 TABLET, FILM COATED ORAL EVERY 6 HOURS PRN
Qty: 15 TABLET | Refills: 0 | Status: SHIPPED | OUTPATIENT
Start: 2025-02-09

## 2025-02-09 RX ORDER — NAPROXEN 250 MG/1
500 TABLET ORAL
Status: COMPLETED | OUTPATIENT
Start: 2025-02-09 | End: 2025-02-09

## 2025-02-09 RX ORDER — HYDROCODONE BITARTRATE AND ACETAMINOPHEN 5; 325 MG/1; MG/1
1 TABLET ORAL
Status: COMPLETED | OUTPATIENT
Start: 2025-02-09 | End: 2025-02-09

## 2025-02-09 RX ADMIN — HYDROCODONE BITARTRATE AND ACETAMINOPHEN 1 TABLET: 5; 325 TABLET ORAL at 19:40

## 2025-02-09 RX ADMIN — AMOXICILLIN AND CLAVULANATE POTASSIUM 1 TABLET: 875; 125 TABLET, FILM COATED ORAL at 19:40

## 2025-02-09 RX ADMIN — NAPROXEN 500 MG: 250 TABLET ORAL at 19:40

## 2025-02-09 ASSESSMENT — PAIN - FUNCTIONAL ASSESSMENT: PAIN_FUNCTIONAL_ASSESSMENT: 0-10

## 2025-02-09 ASSESSMENT — PAIN SCALES - GENERAL: PAINLEVEL_OUTOF10: 10

## 2025-02-09 ASSESSMENT — PAIN DESCRIPTION - ORIENTATION: ORIENTATION: LEFT

## 2025-02-09 ASSESSMENT — PAIN DESCRIPTION - LOCATION: LOCATION: TEETH

## 2025-02-09 NOTE — DISCHARGE INSTRUCTIONS
Take all antibiotics as scheduled until they are completely finished, even if you start to feel better sooner. The antibiotics you were given will help reduce the infection and resulting pain, but your dental problem will not go away without the help of a dentist.  We are not dentists.  Fever and facial swelling are indications of worsening that would require you to see a dentist immediately.      To find a listing of dental clinics in [facility], visit [email or web address].  If you need assistance finding dental services, call [facility]’s Dental Access Line at [phone] ext. [phone].       [facility] in [facility] in alphabetical order      [facility]  [address] [place], [place] [id number] [phone]  [email or web address]  Services: Dental exams, x-rays, cleanings, restorations (fillings) and extractions  Eligibility: Uninsured residents of [place] with household incomes below 200% of federal poverty. Proof of income & residency required.  Fees: Minimal cost to qualified individuals       [facility]  [address], [place]  [phone]  [email or web address]    Services: Dental exams, x-rays, cleanings, restorations (fillings) and extractions  Eligibility: Uninsured residents of [facility] with household incomes below 200% of federal poverty. Proof of income & residency required.  Fees: $20         [facility] [address] [address] [phone]  [email or web address]  Services: Dental Exams, Cleanings, Fluoride Treatments, Dental Sealant, Dental Fillings, Dental Extractions, Root Canals, Partial and Full Dentures, Other treatment as indicated by the Attending Dentist. [facility] offers: Preventive and Restorative Dental Care in the

## 2025-02-09 NOTE — ED TRIAGE NOTES
Pt presents ambulatory to Ed for c/o dental pain to left upper and lower teeth. Pt also c/o headache from dental pain

## 2025-02-10 NOTE — ED PROVIDER NOTES
EMERGENCY DEPARTMENT HISTORY AND PHYSICAL EXAM      Date: 2/9/2025  Patient Name: Lara Montesinos    History of Presenting Illness     Chief Complaint   Patient presents with    Dental Pain       History (Context): Lara Montesinos is a 59 y.o. female  presents to the ED today with chief complaint of dental pain and headache.  Patient states symptoms have been ongoing for over 1 week.  Initially started off with dental pain to the left upper and lower teeth and states that she has been trying to follow-up with general dentistry however unable to.  Now endorsing headache due to her dental pain.  Denies any fever, chills, difficulty tolerating oral secretions, trismus, or change in phonation.  Denies taking any medication for treatment of her symptoms prior to arrival.      PCP: No primary care provider on file.    No current facility-administered medications for this encounter.     Current Outpatient Medications   Medication Sig Dispense Refill    amoxicillin-clavulanate (AUGMENTIN) 875-125 MG per tablet Take 1 tablet by mouth 2 times daily for 7 days 14 tablet 0    ketorolac (TORADOL) 10 MG tablet Take 1 tablet by mouth every 6 hours as needed for Pain 15 tablet 0    chlorhexidine (PERIDEX) 0.12 % solution Swish and spit 15 mLs 2 times daily for 14 days 420 mL 0    Magic Mouthwash (MIRACLE MOUTHWASH) Swish and spit 5 mLs 4 times daily as needed for Irritation 240 mL 0    cetirizine (ZYRTEC) 10 MG tablet Take 1 tablet by mouth daily 30 tablet 0    albuterol sulfate HFA (PROVENTIL;VENTOLIN;PROAIR) 108 (90 Base) MCG/ACT inhaler 2 puffs every 4 hours prn      estrogens conjugated (PREMARIN) 0.625 MG/GM CREA vaginal cream Place 0.5 g vaginally daily         Past History     Past Medical History:   Past Medical History:   Diagnosis Date    Bronchitis     Bronchitis     Obesity     Respiratory abnormalities     Sickle cell trait (HCC)        Past Surgical History:  Past Surgical History:   Procedure Laterality Date

## 2025-03-30 ENCOUNTER — APPOINTMENT (OUTPATIENT)
Facility: HOSPITAL | Age: 60
DRG: 465 | End: 2025-03-30
Payer: MEDICAID

## 2025-03-30 ENCOUNTER — HOSPITAL ENCOUNTER (INPATIENT)
Facility: HOSPITAL | Age: 60
LOS: 2 days | Discharge: HOME | DRG: 465 | End: 2025-04-02
Attending: EMERGENCY MEDICINE | Admitting: INTERNAL MEDICINE
Payer: MEDICAID

## 2025-03-30 DIAGNOSIS — N20.1 URETEROLITHIASIS: Primary | ICD-10-CM

## 2025-03-30 DIAGNOSIS — E87.6 HYPOKALEMIA: ICD-10-CM

## 2025-03-30 DIAGNOSIS — R10.9 ABDOMINAL PAIN, UNSPECIFIED ABDOMINAL LOCATION: ICD-10-CM

## 2025-03-30 LAB
ALBUMIN SERPL-MCNC: 3.8 G/DL (ref 3.4–5)
ALBUMIN/GLOB SERPL: 1 (ref 0.8–1.7)
ALP SERPL-CCNC: 85 U/L (ref 45–117)
ALT SERPL-CCNC: 19 U/L (ref 13–56)
ANION GAP SERPL CALC-SCNC: 6 MMOL/L (ref 3–18)
APPEARANCE UR: CLEAR
AST SERPL-CCNC: 12 U/L (ref 10–38)
BASOPHILS # BLD: 0.05 K/UL (ref 0–0.1)
BASOPHILS NFR BLD: 0.4 % (ref 0–2)
BILIRUB DIRECT SERPL-MCNC: 0.2 MG/DL (ref 0–0.2)
BILIRUB SERPL-MCNC: 0.5 MG/DL (ref 0.2–1)
BILIRUB UR QL: NEGATIVE
BUN SERPL-MCNC: 11 MG/DL (ref 7–18)
BUN/CREAT SERPL: 11 (ref 12–20)
CALCIUM SERPL-MCNC: 9.5 MG/DL (ref 8.5–10.1)
CHLORIDE SERPL-SCNC: 105 MMOL/L (ref 100–111)
CO2 SERPL-SCNC: 29 MMOL/L (ref 21–32)
COLOR UR: YELLOW
CREAT SERPL-MCNC: 1.04 MG/DL (ref 0.6–1.3)
DIFFERENTIAL METHOD BLD: ABNORMAL
EOSINOPHIL # BLD: 0.1 K/UL (ref 0–0.4)
EOSINOPHIL NFR BLD: 0.8 % (ref 0–5)
ERYTHROCYTE [DISTWIDTH] IN BLOOD BY AUTOMATED COUNT: 14.5 % (ref 11.6–14.5)
GLOBULIN SER CALC-MCNC: 4 G/DL (ref 2–4)
GLUCOSE SERPL-MCNC: 134 MG/DL (ref 74–99)
GLUCOSE UR STRIP.AUTO-MCNC: NEGATIVE MG/DL
HCT VFR BLD AUTO: 44.2 % (ref 35–45)
HGB BLD-MCNC: 14.6 G/DL (ref 12–16)
HGB UR QL STRIP: NEGATIVE
IMM GRANULOCYTES # BLD AUTO: 0.03 K/UL (ref 0–0.04)
IMM GRANULOCYTES NFR BLD AUTO: 0.2 % (ref 0–0.5)
KETONES UR QL STRIP.AUTO: NEGATIVE MG/DL
LEUKOCYTE ESTERASE UR QL STRIP.AUTO: NEGATIVE
LIPASE SERPL-CCNC: 17 U/L (ref 13–75)
LYMPHOCYTES # BLD: 1.84 K/UL (ref 0.9–3.6)
LYMPHOCYTES NFR BLD: 14.8 % (ref 21–52)
MCH RBC QN AUTO: 25.6 PG (ref 24–34)
MCHC RBC AUTO-ENTMCNC: 33 G/DL (ref 31–37)
MCV RBC AUTO: 77.5 FL (ref 78–100)
MONOCYTES # BLD: 1.33 K/UL (ref 0.05–1.2)
MONOCYTES NFR BLD: 10.7 % (ref 3–10)
NEUTS SEG # BLD: 9.11 K/UL (ref 1.8–8)
NEUTS SEG NFR BLD: 73.1 % (ref 40–73)
NITRITE UR QL STRIP.AUTO: NEGATIVE
NRBC # BLD: 0 K/UL (ref 0–0.01)
NRBC BLD-RTO: 0 PER 100 WBC
PH UR STRIP: 6 (ref 5–8)
PLATELET # BLD AUTO: 245 K/UL (ref 135–420)
PMV BLD AUTO: 11 FL (ref 9.2–11.8)
POTASSIUM SERPL-SCNC: 3.2 MMOL/L (ref 3.5–5.5)
PROT SERPL-MCNC: 7.8 G/DL (ref 6.4–8.2)
PROT UR STRIP-MCNC: NEGATIVE MG/DL
RBC # BLD AUTO: 5.7 M/UL (ref 4.2–5.3)
SODIUM SERPL-SCNC: 140 MMOL/L (ref 136–145)
SP GR UR REFRACTOMETRY: >1.03 (ref 1–1.03)
TROPONIN I SERPL HS-MCNC: 5 NG/L (ref 0–54)
UROBILINOGEN UR QL STRIP.AUTO: 1 EU/DL (ref 0.2–1)
WBC # BLD AUTO: 12.5 K/UL (ref 4.6–13.2)

## 2025-03-30 PROCEDURE — 93005 ELECTROCARDIOGRAM TRACING: CPT | Performed by: EMERGENCY MEDICINE

## 2025-03-30 PROCEDURE — 84484 ASSAY OF TROPONIN QUANT: CPT

## 2025-03-30 PROCEDURE — 81003 URINALYSIS AUTO W/O SCOPE: CPT

## 2025-03-30 PROCEDURE — 2580000003 HC RX 258: Performed by: EMERGENCY MEDICINE

## 2025-03-30 PROCEDURE — 80076 HEPATIC FUNCTION PANEL: CPT

## 2025-03-30 PROCEDURE — 74177 CT ABD & PELVIS W/CONTRAST: CPT

## 2025-03-30 PROCEDURE — 99285 EMERGENCY DEPT VISIT HI MDM: CPT

## 2025-03-30 PROCEDURE — 80048 BASIC METABOLIC PNL TOTAL CA: CPT

## 2025-03-30 PROCEDURE — 6360000004 HC RX CONTRAST MEDICATION: Performed by: EMERGENCY MEDICINE

## 2025-03-30 PROCEDURE — 96376 TX/PRO/DX INJ SAME DRUG ADON: CPT

## 2025-03-30 PROCEDURE — 96374 THER/PROPH/DIAG INJ IV PUSH: CPT

## 2025-03-30 PROCEDURE — 85025 COMPLETE CBC W/AUTO DIFF WBC: CPT

## 2025-03-30 PROCEDURE — 83690 ASSAY OF LIPASE: CPT

## 2025-03-30 PROCEDURE — 6360000002 HC RX W HCPCS: Performed by: EMERGENCY MEDICINE

## 2025-03-30 RX ORDER — IOPAMIDOL 612 MG/ML
100 INJECTION, SOLUTION INTRAVASCULAR
Status: COMPLETED | OUTPATIENT
Start: 2025-03-30 | End: 2025-03-30

## 2025-03-30 RX ORDER — 0.9 % SODIUM CHLORIDE 0.9 %
1000 INTRAVENOUS SOLUTION INTRAVENOUS ONCE
Status: COMPLETED | OUTPATIENT
Start: 2025-03-30 | End: 2025-03-30

## 2025-03-30 RX ADMIN — HYDROMORPHONE HYDROCHLORIDE 0.5 MG: 1 INJECTION, SOLUTION INTRAMUSCULAR; INTRAVENOUS; SUBCUTANEOUS at 23:46

## 2025-03-30 RX ADMIN — SODIUM CHLORIDE 1000 ML: 0.9 INJECTION, SOLUTION INTRAVENOUS at 21:41

## 2025-03-30 RX ADMIN — IOPAMIDOL 100 ML: 612 INJECTION, SOLUTION INTRAVENOUS at 22:28

## 2025-03-30 RX ADMIN — HYDROMORPHONE HYDROCHLORIDE 0.5 MG: 1 INJECTION, SOLUTION INTRAMUSCULAR; INTRAVENOUS; SUBCUTANEOUS at 21:38

## 2025-03-30 ASSESSMENT — PAIN SCALES - GENERAL
PAINLEVEL_OUTOF10: 8
PAINLEVEL_OUTOF10: 0

## 2025-03-30 ASSESSMENT — PAIN DESCRIPTION - DESCRIPTORS: DESCRIPTORS: ACHING

## 2025-03-30 ASSESSMENT — PAIN - FUNCTIONAL ASSESSMENT
PAIN_FUNCTIONAL_ASSESSMENT: ACTIVITIES ARE NOT PREVENTED
PAIN_FUNCTIONAL_ASSESSMENT: 0-10

## 2025-03-30 ASSESSMENT — LIFESTYLE VARIABLES
HOW OFTEN DO YOU HAVE A DRINK CONTAINING ALCOHOL: NEVER
HOW MANY STANDARD DRINKS CONTAINING ALCOHOL DO YOU HAVE ON A TYPICAL DAY: PATIENT DOES NOT DRINK

## 2025-03-30 ASSESSMENT — PAIN DESCRIPTION - LOCATION: LOCATION: ABDOMEN

## 2025-03-30 ASSESSMENT — PAIN DESCRIPTION - ORIENTATION: ORIENTATION: OTHER (COMMENT)

## 2025-03-31 ENCOUNTER — ANESTHESIA EVENT (OUTPATIENT)
Facility: HOSPITAL | Age: 60
DRG: 465 | End: 2025-03-31
Payer: MEDICAID

## 2025-03-31 ENCOUNTER — ANESTHESIA (OUTPATIENT)
Facility: HOSPITAL | Age: 60
DRG: 465 | End: 2025-03-31
Payer: MEDICAID

## 2025-03-31 ENCOUNTER — APPOINTMENT (OUTPATIENT)
Facility: HOSPITAL | Age: 60
DRG: 465 | End: 2025-03-31
Payer: MEDICAID

## 2025-03-31 PROBLEM — N13.9 OBSTRUCTIVE UROPATHY: Status: ACTIVE | Noted: 2025-03-31

## 2025-03-31 PROBLEM — N20.1 URETEROLITHIASIS: Status: ACTIVE | Noted: 2025-03-31

## 2025-03-31 PROBLEM — E87.6 HYPOKALEMIA: Status: ACTIVE | Noted: 2025-03-31

## 2025-03-31 LAB
EKG ATRIAL RATE: 78 BPM
EKG DIAGNOSIS: NORMAL
EKG P AXIS: 49 DEGREES
EKG P-R INTERVAL: 126 MS
EKG Q-T INTERVAL: 368 MS
EKG QRS DURATION: 82 MS
EKG QTC CALCULATION (BAZETT): 419 MS
EKG R AXIS: 25 DEGREES
EKG T AXIS: 11 DEGREES
EKG VENTRICULAR RATE: 78 BPM
LACTATE SERPL-SCNC: 1 MMOL/L (ref 0.4–2)
MAGNESIUM SERPL-MCNC: 2 MG/DL (ref 1.6–2.6)

## 2025-03-31 PROCEDURE — 99222 1ST HOSP IP/OBS MODERATE 55: CPT | Performed by: INTERNAL MEDICINE

## 2025-03-31 PROCEDURE — 6360000002 HC RX W HCPCS: Performed by: EMERGENCY MEDICINE

## 2025-03-31 PROCEDURE — G0378 HOSPITAL OBSERVATION PER HR: HCPCS

## 2025-03-31 PROCEDURE — 6370000000 HC RX 637 (ALT 250 FOR IP): Performed by: INTERNAL MEDICINE

## 2025-03-31 PROCEDURE — 36415 COLL VENOUS BLD VENIPUNCTURE: CPT

## 2025-03-31 PROCEDURE — 6360000002 HC RX W HCPCS: Performed by: INTERNAL MEDICINE

## 2025-03-31 PROCEDURE — 83605 ASSAY OF LACTIC ACID: CPT

## 2025-03-31 PROCEDURE — 97161 PT EVAL LOW COMPLEX 20 MIN: CPT

## 2025-03-31 PROCEDURE — 83735 ASSAY OF MAGNESIUM: CPT

## 2025-03-31 PROCEDURE — 2580000003 HC RX 258: Performed by: INTERNAL MEDICINE

## 2025-03-31 PROCEDURE — 93010 ELECTROCARDIOGRAM REPORT: CPT | Performed by: INTERNAL MEDICINE

## 2025-03-31 PROCEDURE — 2500000003 HC RX 250 WO HCPCS: Performed by: INTERNAL MEDICINE

## 2025-03-31 PROCEDURE — 1100000000 HC RM PRIVATE

## 2025-03-31 RX ORDER — SODIUM CHLORIDE 9 MG/ML
INJECTION, SOLUTION INTRAVENOUS PRN
Status: DISCONTINUED | OUTPATIENT
Start: 2025-03-31 | End: 2025-04-02 | Stop reason: HOSPADM

## 2025-03-31 RX ORDER — POTASSIUM CHLORIDE 1500 MG/1
40 TABLET, EXTENDED RELEASE ORAL EVERY 4 HOURS
Status: COMPLETED | OUTPATIENT
Start: 2025-03-31 | End: 2025-03-31

## 2025-03-31 RX ORDER — SODIUM CHLORIDE 9 MG/ML
INJECTION, SOLUTION INTRAVENOUS CONTINUOUS
Status: DISPENSED | OUTPATIENT
Start: 2025-03-31 | End: 2025-03-31

## 2025-03-31 RX ORDER — MAGNESIUM SULFATE IN WATER 40 MG/ML
2000 INJECTION, SOLUTION INTRAVENOUS PRN
Status: DISCONTINUED | OUTPATIENT
Start: 2025-03-31 | End: 2025-04-02 | Stop reason: HOSPADM

## 2025-03-31 RX ORDER — SODIUM CHLORIDE 0.9 % (FLUSH) 0.9 %
5-40 SYRINGE (ML) INJECTION PRN
Status: DISCONTINUED | OUTPATIENT
Start: 2025-03-31 | End: 2025-04-02 | Stop reason: HOSPADM

## 2025-03-31 RX ORDER — ACETAMINOPHEN 650 MG/1
650 SUPPOSITORY RECTAL EVERY 8 HOURS PRN
Status: DISCONTINUED | OUTPATIENT
Start: 2025-03-31 | End: 2025-04-02 | Stop reason: HOSPADM

## 2025-03-31 RX ORDER — ALBUTEROL SULFATE 0.83 MG/ML
2.5 SOLUTION RESPIRATORY (INHALATION) EVERY 4 HOURS PRN
Status: DISCONTINUED | OUTPATIENT
Start: 2025-03-31 | End: 2025-04-02 | Stop reason: HOSPADM

## 2025-03-31 RX ORDER — POTASSIUM CHLORIDE 1500 MG/1
40 TABLET, EXTENDED RELEASE ORAL PRN
Status: DISCONTINUED | OUTPATIENT
Start: 2025-03-31 | End: 2025-04-02 | Stop reason: HOSPADM

## 2025-03-31 RX ORDER — ONDANSETRON 2 MG/ML
4 INJECTION INTRAMUSCULAR; INTRAVENOUS EVERY 8 HOURS PRN
Status: DISCONTINUED | OUTPATIENT
Start: 2025-03-31 | End: 2025-04-02 | Stop reason: HOSPADM

## 2025-03-31 RX ORDER — POTASSIUM CHLORIDE 7.45 MG/ML
10 INJECTION INTRAVENOUS PRN
Status: DISCONTINUED | OUTPATIENT
Start: 2025-03-31 | End: 2025-04-02 | Stop reason: HOSPADM

## 2025-03-31 RX ORDER — ACETAMINOPHEN 325 MG/1
650 TABLET ORAL EVERY 8 HOURS PRN
Status: DISCONTINUED | OUTPATIENT
Start: 2025-03-31 | End: 2025-04-02 | Stop reason: HOSPADM

## 2025-03-31 RX ORDER — POLYETHYLENE GLYCOL 3350 17 G/17G
17 POWDER, FOR SOLUTION ORAL DAILY PRN
Status: DISCONTINUED | OUTPATIENT
Start: 2025-03-31 | End: 2025-04-02 | Stop reason: HOSPADM

## 2025-03-31 RX ORDER — TAMSULOSIN HYDROCHLORIDE 0.4 MG/1
0.4 CAPSULE ORAL DAILY
Status: DISCONTINUED | OUTPATIENT
Start: 2025-03-31 | End: 2025-04-02 | Stop reason: HOSPADM

## 2025-03-31 RX ORDER — SODIUM CHLORIDE 0.9 % (FLUSH) 0.9 %
5-40 SYRINGE (ML) INJECTION EVERY 12 HOURS SCHEDULED
Status: DISCONTINUED | OUTPATIENT
Start: 2025-03-31 | End: 2025-04-02 | Stop reason: HOSPADM

## 2025-03-31 RX ORDER — ONDANSETRON 4 MG/1
4 TABLET, ORALLY DISINTEGRATING ORAL EVERY 8 HOURS PRN
Status: DISCONTINUED | OUTPATIENT
Start: 2025-03-31 | End: 2025-04-02 | Stop reason: HOSPADM

## 2025-03-31 RX ADMIN — POTASSIUM CHLORIDE 40 MEQ: 1500 TABLET, EXTENDED RELEASE ORAL at 09:44

## 2025-03-31 RX ADMIN — SODIUM CHLORIDE, PRESERVATIVE FREE 10 ML: 5 INJECTION INTRAVENOUS at 20:42

## 2025-03-31 RX ADMIN — HYDROMORPHONE HYDROCHLORIDE 0.5 MG: 1 INJECTION, SOLUTION INTRAMUSCULAR; INTRAVENOUS; SUBCUTANEOUS at 17:47

## 2025-03-31 RX ADMIN — CEFTRIAXONE 1000 MG: 1 INJECTION, POWDER, FOR SOLUTION INTRAMUSCULAR; INTRAVENOUS at 06:49

## 2025-03-31 RX ADMIN — HYDROMORPHONE HYDROCHLORIDE 0.5 MG: 1 INJECTION, SOLUTION INTRAMUSCULAR; INTRAVENOUS; SUBCUTANEOUS at 06:44

## 2025-03-31 RX ADMIN — POTASSIUM CHLORIDE 40 MEQ: 1500 TABLET, EXTENDED RELEASE ORAL at 06:49

## 2025-03-31 RX ADMIN — POTASSIUM CHLORIDE 40 MEQ: 1500 TABLET, EXTENDED RELEASE ORAL at 13:48

## 2025-03-31 RX ADMIN — TAMSULOSIN HYDROCHLORIDE 0.4 MG: 0.4 CAPSULE ORAL at 06:49

## 2025-03-31 RX ADMIN — HYDROMORPHONE HYDROCHLORIDE 0.5 MG: 1 INJECTION, SOLUTION INTRAMUSCULAR; INTRAVENOUS; SUBCUTANEOUS at 03:09

## 2025-03-31 RX ADMIN — SODIUM CHLORIDE: 0.9 INJECTION, SOLUTION INTRAVENOUS at 06:48

## 2025-03-31 RX ADMIN — HYDROMORPHONE HYDROCHLORIDE 0.5 MG: 1 INJECTION, SOLUTION INTRAMUSCULAR; INTRAVENOUS; SUBCUTANEOUS at 09:46

## 2025-03-31 RX ADMIN — SODIUM CHLORIDE, PRESERVATIVE FREE 10 ML: 5 INJECTION INTRAVENOUS at 09:45

## 2025-03-31 ASSESSMENT — PAIN - FUNCTIONAL ASSESSMENT
PAIN_FUNCTIONAL_ASSESSMENT: ACTIVITIES ARE NOT PREVENTED

## 2025-03-31 ASSESSMENT — PAIN SCALES - GENERAL
PAINLEVEL_OUTOF10: 10
PAINLEVEL_OUTOF10: 4
PAINLEVEL_OUTOF10: 7
PAINLEVEL_OUTOF10: 0
PAINLEVEL_OUTOF10: 8
PAINLEVEL_OUTOF10: 0
PAINLEVEL_OUTOF10: 0
PAINLEVEL_OUTOF10: 10
PAINLEVEL_OUTOF10: 8

## 2025-03-31 ASSESSMENT — PAIN DESCRIPTION - DESCRIPTORS
DESCRIPTORS: ACHING;SHARP
DESCRIPTORS: OTHER (COMMENT)
DESCRIPTORS: ACHING
DESCRIPTORS: ACHING;SHARP
DESCRIPTORS: OTHER (COMMENT)
DESCRIPTORS: ACHING

## 2025-03-31 ASSESSMENT — PAIN DESCRIPTION - LOCATION
LOCATION: ABDOMEN
LOCATION: ABDOMEN;FLANK
LOCATION: ABDOMEN;BACK
LOCATION: ABDOMEN
LOCATION: BACK;ABDOMEN
LOCATION: ABDOMEN;FLANK

## 2025-03-31 ASSESSMENT — PAIN DESCRIPTION - ORIENTATION
ORIENTATION: RIGHT
ORIENTATION: OTHER (COMMENT)
ORIENTATION: RIGHT
ORIENTATION: OTHER (COMMENT)

## 2025-03-31 ASSESSMENT — PAIN DESCRIPTION - ONSET
ONSET: GRADUAL
ONSET: PROGRESSIVE
ONSET: GRADUAL
ONSET: ON-GOING
ONSET: ON-GOING

## 2025-03-31 ASSESSMENT — PAIN DESCRIPTION - FREQUENCY
FREQUENCY: CONTINUOUS
FREQUENCY: INTERMITTENT

## 2025-03-31 ASSESSMENT — PAIN DESCRIPTION - PAIN TYPE
TYPE: ACUTE PAIN

## 2025-03-31 NOTE — CARE COORDINATION
CM went to see patient to complete an Initial CM Assessment.   Patient is sleeping soundly at this time.             Karly Ferrara RN Case Manager  [phone]

## 2025-03-31 NOTE — H&P
Department of Internal Medicine    Attending History and Physical      CHIEF COMPLAINT: My stomach hurts.    Reason for Admission: Obstructive uropathy        HISTORY OF PRESENT ILLNESS:      The patient is a 60 y.o. female with significant past medical history of multiple intra-abdominal surgeries who presents with complaint of abdominal pain x 3-day duration associated with nausea and vomiting.  Patient was asked about any fever or chills.  Patient denies.  Patient denies any chest pain palpitation shortness of breath or lateralizing symptoms.  As part of patient's evaluation laboratory studies were obtained.  Patient was found to have borderline elevation of white count with left shift increasing concern for infection.  Electrolyte panel overall reassuring.  Urinalysis obtained and found to be reassuring.  CT scan of patient abdomen pelvis obtained revealing patient have evidence of 8 x 7 mm stone in the right proximal ureter with post-obstructive changes, i.e. hydronephrosis.    Past Medical History:    Sickle cell trait  Obesity  Cholecystectomy  Colonic diverticulosis   Tubal ligation    Medications Prior to Admission:    No current facility-administered medications on file prior to encounter.     Current Outpatient Medications on File Prior to Encounter   Medication Sig Dispense Refill    ketorolac (TORADOL) 10 MG tablet Take 1 tablet by mouth every 6 hours as needed for Pain 15 tablet 0    Magic Mouthwash (MIRACLE MOUTHWASH) Swish and spit 5 mLs 4 times daily as needed for Irritation 240 mL 0    cetirizine (ZYRTEC) 10 MG tablet Take 1 tablet by mouth daily 30 tablet 0    albuterol sulfate HFA (PROVENTIL;VENTOLIN;PROAIR) 108 (90 Base) MCG/ACT inhaler 2 puffs every 4 hours prn      estrogens conjugated (PREMARIN) 0.625 MG/GM CREA vaginal cream Place 0.5 g vaginally daily           Allergies:  Ibuprofen    Social History:   Tobacco abuse: Quarter pack per day times already manage 45 years    Family History:

## 2025-03-31 NOTE — PROGRESS NOTES
OT order received and chart reviewed.  Spoke with patient in room and patient declined need for skilled OT at this time as she just finished washing up independently in restroom. Patient is independent with basic self care tasks and functional mobility. No skilled OT needs indicated at this time; will complete the order.          Thank you for the referral,  Bryan Leavitt MS OTR/L

## 2025-03-31 NOTE — PLAN OF CARE
Problem: Pain  Goal: Verbalizes/displays adequate comfort level or baseline comfort level  Flowsheets (Taken 3/31/2025 6309)  Verbalizes/displays adequate comfort level or baseline comfort level:   Encourage patient to monitor pain and request assistance   Assess pain using appropriate pain scale  Note: Check for pain quality, severity, location, onset, duration, precipitating, and relieving factors.      Problem: Safety - Adult  Goal: Free from fall injury  Outcome: Progressing  Note:   Patient will engage in safe behavior and take action to reduce chance of injury.

## 2025-03-31 NOTE — ED NOTES
[facility] at bedside for transfer to [facility] rm 512 for admission, Emtala and PCS signed. Provided to transport MMT, report provided.

## 2025-03-31 NOTE — ED NOTES
Introduced self to family that is now at bedside , At bedside pt resting on stretcher respirations even and unlabored at this time.  VSS , NAD noted at this time. Plan of care discussed Dr moffett at bedside. Will contact son for further information or change in status.Warm blanker provided and lights dimmed for comfort , awaiting bed placement pt and family aware  Voiced no issues or concerns at this time.

## 2025-03-31 NOTE — ED NOTES
Orders placed ro admit , patient family to bedside , Pt toleraitn ice chips. No nausea or vomiting note d

## 2025-03-31 NOTE — ED NOTES
Itrodused self to patient .  Assumed care of pt resting on stretcher, voiced abdominal pain and constipation  respirations even and unlabored , call bell in reach NAD noted, VS assessed pt hypertensive, allergies verified pt medicated per MAR. Pt awaitign CT resutls. Pt aware of plan

## 2025-03-31 NOTE — PROGRESS NOTES
Assumed care of patient from PAULO Cedeño (off going nurse). Patient alert and oriented. No apparent distress noted. Patient offers no concerns and can verbalize needs. Assessment to follow. Call bell within reach. Bed in lowest, locked position.

## 2025-03-31 NOTE — DISCHARGE INSTRUCTIONS
DISCHARGE INSTRUCTIONS FOR CYSTOSCOPY WITH URETERAL STENT PLACEMENT    PROCEDURE:  The procedure you had is called cystoscopy with ureteral stent placement.  The stent was placed in your Right ureter.  The ureter is the tube that drains urine from your kidney to the bladder.  Your doctor used the camera to see inside your bladder and used Xrays to visualize your ureter and your kidney to check for blockage. The kidney stone was causing a blockage, resulting in the pain in your kidney. It looked like there was an infection in the kidney and the kidney stone could not be removed due to the infection. It will have to be removed in about 2 weeks after the infection is treated    DIET:  Resume your normal diet. You should stay well hydrated. Constipation is common after surgery due to narcotic pain medicine, decreased activity and decreased fluids.  You should take a laxative each day until you are no longer taking narcotics and have regular bowel movements.      PHYSICAL ACTIVITY:  There are no activity restrictions following your surgery.  If you have a stent, you may notice that increased physical activity worsens your stent symptoms and/or blood in the urine.     PAIN MANAGEMENT:  It is normal to have pain after surgery, even while taking pain medicine.  Below are the medications that you may have been given to help with pain and associated symptoms.     MEDICATIONS  --What is this used for?  --When to take it?  -Tamsulosin Prescription to decrease ureter spasm. Every night at bedtime. This medication can cause small decreases in blood pressure. If you feel dizzy or lightheaded, lie down and stop taking the medication. If this persists, please call the office for further instructions.  -Oxybutynin Prescription to decrease bladder spasms. Daily as needed. This medication can cause constipation, dry mouth, dry eyes, flushing and rarely inability to urinate  -Phenazopyridine Prescription for burning with urination

## 2025-03-31 NOTE — PROGRESS NOTES
conducted an initial consultation and Spiritual Assessment for Lara Montesinos, who is a 60 y.o.,female. Patient's Primary Language is: English.   According to the patient's EMR Yarsanism Affiliation is: Buddhist.     The reason the Patient came to the hospital is:   Patient Active Problem List    Diagnosis Date Noted    Ureterolithiasis 03/31/2025    Obstructive uropathy 03/31/2025    Hypokalemia 03/31/2025        The  provided the following Interventions:  Initiated a relationship of care and support.   Provided information about Spiritual Care Services.  Chart reviewed.    The following outcomes where achieved:  Patient expressed gratitude for 's visit.    Assessment:  Patient does not have any Anabaptist/cultural needs that will affect patient's preferences in health care.  There are no spiritual or Anabaptist issues which require intervention at this time.     Plan:  Chaplains will continue to follow and will provide pastoral care on an as needed/requested basis.   recommends bedside caregivers page  on duty if patient shows signs of acute spiritual or emotional distress.    Chaplain Andrea Wilkes  Spiritual Care   [phone]     Spiritual Health History and Assessment/Progress Note  [facility]    Initial Encounter,  ,  ,      Name: Lara Montesinos MRN: [id number]    Age: 60 y.o.     Sex: female   Language: English   Druze: Buddhist   Ureterolithiasis     Date: 3/31/2025            Total Time Calculated: 5 min              Spiritual Assessment began in [address] SURGICAL            Encounter Overview/Reason: Initial Encounter  Service Provided For: Patient    Cheryl, Belief, Meaning:   Patient identifies as spiritual and is connected with a cheryl tradition or spiritual practice  Family/Friends No family/friends present      Importance and Influence:  Patient has spiritual/personal beliefs that influence decisions regarding their health  Family/Friends No

## 2025-03-31 NOTE — CONSULTS
Consult Note    Patient: Lara Montesinos               Sex: female          DOA: 3/30/2025       YOB: 1965      Age:  60 y.o.        LOS:  LOS: 0 days              Referring Provider:Jorge Luis Araujo MD    ASSESSMENT:   1. Right flank pain 2/2 8 mm right proximal to mid ureteral stone   WBC: 12.5   sCr: 1.04    UA 3/30/25: neg blood, neg LE, neg nitrites   Ucx sent   CT A/P 3/30/25: 8 x 7 mm stone in the right proximal ureter with  post-obstructive changes, i.e. hydronephrosis and delayed nephrogram.     2. Left renal cyst  12 x 9 mm left renal cyst noted on CT scan 3/30/25          PLAN:    Recommend   -Reviewed ED notes, admission notes  -Reviewed labs, wbc 12.5, hgb 14.6, sCr 1.04, UA neg for infection  -Reviewed CT scan from 3/30/25  -Discussed with CT scan findings and surgical intervention with Right ureteral stent placement  -NPO for right ureteral stent placement in OR today with Dr. Lyon  -Following    Addendum:  -Surgery for Right ureteral stent placement postponed until tomorrow AM with Dr. Lucero.   -Ok to eat tonight  -NPO after midnight for Right ureteral stent placement tomorrow 4/1/25 with Dr. Lucero.    Follow up: Will need definitive stone treatment. Stone pathway sent to [facility].    Brianna Albarran, APRN - CNP  [phone] Office  [phone]  Pager    Chief Complaint   Patient presents with    Abdominal Pain    Nausea    Vomiting       HISTORY OF PRESENT ILLNESS:  Lara Montesinos is a 60 y.o. female who is seen in consultation as referred by Dr.Siddharth Araujo  for 8 mm right proximal to mid ureteral stone with hydronephrosis. Patient was transferred from [facility] ED to [facility] ED and admitted to medicine for intractable right flank pain. ED work up reveals wbc 12.5, hgb 14.6, sCr 1.04, UA neg for infection, CT scan 8 x 7 mm stone in the right proximal ureter with post-obstructive changes, i.e. hydronephrosis and delayed nephrogram.  12 x 9 mm cyst in the left     potassium bicarb-citric acid (EFFER-K) effervescent tablet 40 mEq  40 mEq Oral PRN Miguel Jane MD        Or    potassium chloride 10 mEq/100 mL IVPB (Peripheral Line)  10 mEq IntraVENous PRN Miguel Jane MD        magnesium sulfate 2000 mg in 50 mL IVPB premix  2,000 mg IntraVENous PRN Miguel Jane MD        ondansetron (ZOFRAN-ODT) disintegrating tablet 4 mg  4 mg Oral Q8H PRN Miguel Jane MD        Or    ondansetron (ZOFRAN) injection 4 mg  4 mg IntraVENous Q8H PRN Miguel Jane MD        polyethylene glycol (GLYCOLAX) packet 17 g  17 g Oral Daily PRN Miguel Jane MD        acetaminophen (TYLENOL) tablet 650 mg  650 mg Oral Q8H PRN Miguel Jane MD        Or    acetaminophen (TYLENOL) suppository 650 mg  650 mg Rectal Q8H PRN Miguel Jane MD        0.9 % sodium chloride infusion   IntraVENous Continuous Miguel Jane MD 50 mL/hr at 03/31/25 0648 New Bag at 03/31/25 0648    potassium chloride (KLOR-CON M) extended release tablet 40 mEq  40 mEq Oral Q4H Miguel Jane MD   40 mEq at 03/31/25 0944    cefTRIAXone (ROCEPHIN) 1,000 mg in sterile water 10 mL IV syringe  1,000 mg IntraVENous Q24H Miguel Jane MD   1,000 mg at 03/31/25 0649       Review of Systems  Constitutional: No fever, chills, or weight loss  Respiratory: No dyspnea  Cardiovascular: No chest pain  Gastrointestinal: No vomiting or abdominal pain.  Genitourinary: Denies frequency, urgency, dysuria, hematuria.  Right flank pain  Neurological: No focal motor changes.     PHYSICAL EXAMINATION:   /75   Pulse 57   Temp 97.9 °F (36.6 °C) (Oral)   Resp 16   Ht 1.575 m (5' 2\")   Wt 69.7 kg (153 lb 11.2 oz)   SpO2 93%   BMI 28.11 kg/m²   Constitutional: Well developed, well nourished female.  No acute distress.    HEENT: Normocephalic, Atraumatic, EOM's intact   CV:  Normal radial pulse.  Respiratory: No respiratory distress or difficulties breathing   Abdomen:  Nontender, nondistended.

## 2025-03-31 NOTE — PROGRESS NOTES
Patient seen evaluated, lying in bed, no acute distress.  Patient admitted by my colleague earlier this morning.  60-year-old female with a past medical history of multiple intra-abdominal surgeries parents with complaints of abdominal pain x 3 days duration associated with nausea and vomiting.  CT abdomen pelvis shows an evidence of an 8 x 7 mm stone in the right proximal ureter with postobstructive changes showing hydronephrosis.  Urology consulted, plan for cystoscopy with ureteral stent placement today.  Will follow postprocedure.  Continue IV antibiotics, will follow.

## 2025-03-31 NOTE — ED NOTES
Spoke with patients son, made aware pt will be admitted, once bed is assigned pt will be  be tranfers with transportation to [facility] no bed assigned at this time ,        Please call Garry espino [phone]

## 2025-03-31 NOTE — PLAN OF CARE
Problem: Discharge Planning  Goal: Discharge to home or other facility with appropriate resources  Outcome: Progressing     Problem: Safety - Adult  Goal: Free from fall injury  3/31/2025 1006 by Raven Duncan, RN  Outcome: Progressing  3/31/2025 0425 by Gala Hooks, RN  Outcome: Progressing  Note:   Patient will engage in safe behavior and take action to reduce chance of injury.

## 2025-03-31 NOTE — ED NOTES
Pt aware of bed assignment belongings placed in patient belonging bag and labeled. Pt has cell pone and purse on her person . Pt son Garry updated on plan, all questions answered. Pt signed Emtala and resting on stretcher, BP and SPO2 in place,

## 2025-03-31 NOTE — ED PROVIDER NOTES
[facility] EMERGENCY DEPARTMENT  EMERGENCY DEPARTMENT ENCOUNTER      Pt Name: Lara Montesinos  MRN: [id number]  Birthdate 1965  Date of evaluation: 3/30/2025  Provider: Khanh Mena MD    CHIEF COMPLAINT       Chief Complaint   Patient presents with    Abdominal Pain    Nausea    Vomiting       HPI:  Lara Montesinos is a 60 y.o. female who presents to the emergency department pt c/o mid abd pain x 3 days w n/v no back or cp. No sob. No fever.      HPI    Nursing Notes were reviewed.    REVIEW OF SYSTEMS    (2-9 systems for level 4, 10 or more for level 5)     Review of Systems    Except as noted above the remainder of the review of systems was reviewed and negative.       PAST MEDICAL HISTORY     Past Medical History:   Diagnosis Date    Bronchitis     Bronchitis     Obesity     Respiratory abnormalities     Sickle cell trait          SURGICAL HISTORY       Past Surgical History:   Procedure Laterality Date    CHOLECYSTECTOMY           CURRENT MEDICATIONS       Previous Medications    ALBUTEROL SULFATE HFA (PROVENTIL;VENTOLIN;PROAIR) 108 (90 BASE) MCG/ACT INHALER    2 puffs every 4 hours prn    CETIRIZINE (ZYRTEC) 10 MG TABLET    Take 1 tablet by mouth daily    ESTROGENS CONJUGATED (PREMARIN) 0.625 MG/GM CREA VAGINAL CREAM    Place 0.5 g vaginally daily    KETOROLAC (TORADOL) 10 MG TABLET    Take 1 tablet by mouth every 6 hours as needed for Pain    MAGIC MOUTHWASH (MIRACLE MOUTHWASH)    Swish and spit 5 mLs 4 times daily as needed for Irritation       ALLERGIES     Ibuprofen    FAMILY HISTORY     History reviewed. No pertinent family history.       SOCIAL HISTORY       Social History     Socioeconomic History    Marital status: Single     Spouse name: None    Number of children: None    Years of education: None    Highest education level: None   Tobacco Use    Smoking status: Every Day     Current packs/day: 0.20     Types: Cigarettes    Smokeless tobacco: Never   Vaping Use    Vaping  [address]  [facility] [id number] [phone]  Go to   As needed, If symptoms worsen    Duane Blevins MD  [address] [facility]  [address]  [facility] [address] [phone]    Schedule an appointment as soon as possible for a visit in 2 days        DISCHARGE MEDICATIONS:  New Prescriptions    No medications on file     Controlled Substances Monitoring:          No data to display                (Please note that portions of this note were completed with a voice recognition program.  Efforts were made to edit the dictations but occasionally words are mis-transcribed.)    Khanh Mena MD (electronically signed)  Attending Emergency Physician          Khanh Mena MD  03/31/25 0111

## 2025-03-31 NOTE — PROGRESS NOTES
Physical Therapy  PHYSICAL THERAPY EVALUATION/DISCHARGE    Patient: Lara Montesinos (60 y.o. female)  Date: 3/31/2025  Primary Diagnosis: Hypokalemia [E87.6]  Ureterolithiasis [N20.1]  Abdominal pain, unspecified abdominal location [R10.9]  Obstructive uropathy [N13.9]  Procedure(s) (LRB):  CYSTOSCOPY; RIGHT RETROGRADE PYELOGRAM; RIGHT STENT PLACEMENT; C-ARM (Right)     Precautions: General Precautions,  ,  ,  ,  ,  ,  ,    PLOF: Lives with daughter in a 1 story home with 3 ISABEL, ind prior to admission.     ASSESSMENT AND RECOMMENDATIONS:  Pt cleared by nursing. Pt received in bed asleep, wakes to voice, educated on PT role and evaluation process and agreeable. Pt ind to EOB, good seated balance and 4+/5 BLE for knee extension, hip flexion, and ankle DF/PF; reports intact sensation. Ind to stand, reports lightheadedness and sits back down but then stands back up and ambulates within room with steady gait, able to ind manage IV pole safely. Returns to EOB and back to supine. Call bell and all needs left within reach. Pt is at functional baseline and skilled acute care PT is not indicated at this time, will sign off.       Patient does not require further skilled physical therapy intervention at this level of care.    Further Equipment Recommendations for Discharge: None    [facility]: AM-PAC Inpatient Mobility Raw Score : 24      At this time and based on an AM-PAC score, no further PT is recommended upon discharge.  Recommend patient returns to prior setting with prior services.    This [facility] score should be considered in conjunction with interdisciplinary team recommendations to determine the most appropriate discharge setting. Patient's social support, diagnosis, medical stability, and prior level of function should also be taken into consideration.     SUBJECTIVE:   Patient stated “I'm still sleepy.”    OBJECTIVE DATA SUMMARY:     Past Medical History:   Diagnosis Date    Bronchitis     Bronchitis     Obesity      no apparent distress sitting up in chair  [x]         Patient left in no apparent distress in bed  [x]         Call bell left within reach  [x]         Nursing notified  []         Caregiver present  []         Bed/chair alarm activated  [x]         No alarmIndependent  []         SCDs applied    COMMUNICATION/EDUCATION:   Patient Education  Education Given To: Patient  Education Provided: Role of Therapy;Plan of Care;Mobility Training;Energy Conservation  Education Method: Verbal;Teach Back  Barriers to Learning: None  Education Outcome: Verbalized understanding;Demonstrated understanding    Thank you for this referral.  Stella Coombs, PT  Minutes: 9      Eval Complexity: Decision Making: Low Complexity

## 2025-03-31 NOTE — ED NOTES
TRANSFER - OUT REPORT:    Verbal report given to Gala RN  on Lara Montesinos  being transferred to [facility] rn 512 for routine progression of patient care       Report consisted of patient's Situation, Background, Assessment and   Recommendations(SBAR).     Information from the following report(s) Nurse Handoff Report, ED Encounter Summary, ED SBAR, MAR, Recent Results, and Cardiac Rhythm Normal sinus  was reviewed with the receiving nurse.    [facility] Fall Assessment:    Presents to emergency department  because of falls (Syncope, seizure, or loss of consciousness): No  Age > 70: No  Altered Mental Status, Intoxication with alcohol or substance confusion (Disorientation, impaired judgment, poor safety awaremess, or inability to follow instructions): No  Impaired Mobility: Ambulates or transfers with assistive devices or assistance; Unable to ambulate or transer.: No  Nursing Judgement: No          Lines:   Peripheral IV 03/30/25 Right;Dorsal Forearm (Active)   Site Assessment Clean, dry & intact 03/30/25 2133   Line Status Blood return noted 03/30/25 2133   Line Care Connections checked and tightened 03/30/25 2133   Dressing Status Clean, dry & intact 03/30/25 2133   Dressing Type Transparent 03/30/25 2133        Opportunity for questions and clarification was provided.      Patient transported with:  Transport company [employer]

## 2025-03-31 NOTE — ED NOTES
Pt requesting pain medication prior to transport , provider Dr moffett aware awaiting pharmacy approval

## 2025-04-01 ENCOUNTER — APPOINTMENT (OUTPATIENT)
Facility: HOSPITAL | Age: 60
DRG: 465 | End: 2025-04-01
Payer: MEDICAID

## 2025-04-01 LAB
ALBUMIN SERPL-MCNC: 3 G/DL (ref 3.4–5)
ALBUMIN/GLOB SERPL: 0.8 (ref 0.8–1.7)
ALP SERPL-CCNC: 72 U/L (ref 45–117)
ALT SERPL-CCNC: 22 U/L (ref 13–56)
ANION GAP SERPL CALC-SCNC: 8 MMOL/L (ref 3–18)
AST SERPL-CCNC: 15 U/L (ref 10–38)
BILIRUB SERPL-MCNC: 0.9 MG/DL (ref 0.2–1)
BUN SERPL-MCNC: 6 MG/DL (ref 7–18)
BUN/CREAT SERPL: 6 (ref 12–20)
CALCIUM SERPL-MCNC: 8.9 MG/DL (ref 8.5–10.1)
CHLORIDE SERPL-SCNC: 106 MMOL/L (ref 100–111)
CO2 SERPL-SCNC: 22 MMOL/L (ref 21–32)
CREAT SERPL-MCNC: 0.93 MG/DL (ref 0.6–1.3)
GLOBULIN SER CALC-MCNC: 4 G/DL (ref 2–4)
GLUCOSE SERPL-MCNC: 127 MG/DL (ref 74–99)
POTASSIUM SERPL-SCNC: 4.3 MMOL/L (ref 3.5–5.5)
PROT SERPL-MCNC: 7 G/DL (ref 6.4–8.2)
SODIUM SERPL-SCNC: 136 MMOL/L (ref 136–145)

## 2025-04-01 PROCEDURE — 36415 COLL VENOUS BLD VENIPUNCTURE: CPT

## 2025-04-01 PROCEDURE — 80053 COMPREHEN METABOLIC PANEL: CPT

## 2025-04-01 PROCEDURE — 99232 SBSQ HOSP IP/OBS MODERATE 35: CPT | Performed by: INTERNAL MEDICINE

## 2025-04-01 PROCEDURE — 2500000003 HC RX 250 WO HCPCS: Performed by: UROLOGY

## 2025-04-01 PROCEDURE — 3700000000 HC ANESTHESIA ATTENDED CARE: Performed by: UROLOGY

## 2025-04-01 PROCEDURE — BT1D1ZZ FLUOROSCOPY OF RIGHT KIDNEY, URETER AND BLADDER USING LOW OSMOLAR CONTRAST: ICD-10-PCS | Performed by: UROLOGY

## 2025-04-01 PROCEDURE — 74420 UROGRAPHY RTRGR +-KUB: CPT

## 2025-04-01 PROCEDURE — 2580000003 HC RX 258: Performed by: NURSE ANESTHETIST, CERTIFIED REGISTERED

## 2025-04-01 PROCEDURE — 0T768DZ DILATION OF RIGHT URETER WITH INTRALUMINAL DEVICE, VIA NATURAL OR ARTIFICIAL OPENING ENDOSCOPIC: ICD-10-PCS | Performed by: UROLOGY

## 2025-04-01 PROCEDURE — 87086 URINE CULTURE/COLONY COUNT: CPT

## 2025-04-01 PROCEDURE — 6370000000 HC RX 637 (ALT 250 FOR IP): Performed by: INTERNAL MEDICINE

## 2025-04-01 PROCEDURE — 1100000003 HC PRIVATE W/ TELEMETRY

## 2025-04-01 PROCEDURE — 3600000002 HC SURGERY LEVEL 2 BASE: Performed by: UROLOGY

## 2025-04-01 PROCEDURE — 6370000000 HC RX 637 (ALT 250 FOR IP)

## 2025-04-01 PROCEDURE — 2500000003 HC RX 250 WO HCPCS: Performed by: NURSE ANESTHETIST, CERTIFIED REGISTERED

## 2025-04-01 PROCEDURE — 6360000002 HC RX W HCPCS: Performed by: NURSE ANESTHETIST, CERTIFIED REGISTERED

## 2025-04-01 PROCEDURE — 7100000001 HC PACU RECOVERY - ADDTL 15 MIN: Performed by: UROLOGY

## 2025-04-01 PROCEDURE — 6360000004 HC RX CONTRAST MEDICATION: Performed by: UROLOGY

## 2025-04-01 PROCEDURE — 7100000000 HC PACU RECOVERY - FIRST 15 MIN: Performed by: UROLOGY

## 2025-04-01 PROCEDURE — 6360000002 HC RX W HCPCS: Performed by: INTERNAL MEDICINE

## 2025-04-01 PROCEDURE — C1769 GUIDE WIRE: HCPCS | Performed by: UROLOGY

## 2025-04-01 PROCEDURE — 3600000012 HC SURGERY LEVEL 2 ADDTL 15MIN: Performed by: UROLOGY

## 2025-04-01 PROCEDURE — 3700000001 HC ADD 15 MINUTES (ANESTHESIA): Performed by: UROLOGY

## 2025-04-01 PROCEDURE — C2617 STENT, NON-COR, TEM W/O DEL: HCPCS | Performed by: UROLOGY

## 2025-04-01 PROCEDURE — 2500000003 HC RX 250 WO HCPCS: Performed by: INTERNAL MEDICINE

## 2025-04-01 PROCEDURE — 2709999900 HC NON-CHARGEABLE SUPPLY: Performed by: UROLOGY

## 2025-04-01 PROCEDURE — 6360000002 HC RX W HCPCS: Performed by: UROLOGY

## 2025-04-01 PROCEDURE — 51702 INSERT TEMP BLADDER CATH: CPT

## 2025-04-01 DEVICE — URETERAL STENT WITH SIDE HOLES 6FX26CM
Type: IMPLANTABLE DEVICE | Site: URETER | Status: FUNCTIONAL
Brand: TRIA™ SOFT

## 2025-04-01 RX ORDER — MAGNESIUM HYDROXIDE 1200 MG/15ML
LIQUID ORAL CONTINUOUS PRN
Status: COMPLETED | OUTPATIENT
Start: 2025-04-01 | End: 2025-04-01

## 2025-04-01 RX ORDER — DEXAMETHASONE SODIUM PHOSPHATE 4 MG/ML
INJECTION, SOLUTION INTRA-ARTICULAR; INTRALESIONAL; INTRAMUSCULAR; INTRAVENOUS; SOFT TISSUE
Status: DISCONTINUED | OUTPATIENT
Start: 2025-04-01 | End: 2025-04-01 | Stop reason: SDUPTHER

## 2025-04-01 RX ORDER — EPHEDRINE SULFATE/0.9% NACL/PF 50 MG/5 ML
SYRINGE (ML) INTRAVENOUS
Status: DISCONTINUED | OUTPATIENT
Start: 2025-04-01 | End: 2025-04-01 | Stop reason: SDUPTHER

## 2025-04-01 RX ORDER — ONDANSETRON 2 MG/ML
4 INJECTION INTRAMUSCULAR; INTRAVENOUS
Status: DISCONTINUED | OUTPATIENT
Start: 2025-04-01 | End: 2025-04-01 | Stop reason: HOSPADM

## 2025-04-01 RX ORDER — SODIUM CHLORIDE 0.9 % (FLUSH) 0.9 %
5-40 SYRINGE (ML) INJECTION PRN
Status: DISCONTINUED | OUTPATIENT
Start: 2025-04-01 | End: 2025-04-01 | Stop reason: HOSPADM

## 2025-04-01 RX ORDER — MIDAZOLAM HYDROCHLORIDE 2 MG/2ML
1 INJECTION, SOLUTION INTRAMUSCULAR; INTRAVENOUS ONCE
Status: DISCONTINUED | OUTPATIENT
Start: 2025-04-01 | End: 2025-04-02 | Stop reason: HOSPADM

## 2025-04-01 RX ORDER — GLUCAGON 1 MG
1 KIT INJECTION PRN
Status: DISCONTINUED | OUTPATIENT
Start: 2025-04-01 | End: 2025-04-01 | Stop reason: HOSPADM

## 2025-04-01 RX ORDER — IODIXANOL 320 MG/ML
INJECTION, SOLUTION INTRAVASCULAR PRN
Status: DISCONTINUED | OUTPATIENT
Start: 2025-04-01 | End: 2025-04-01 | Stop reason: ALTCHOICE

## 2025-04-01 RX ORDER — FENTANYL CITRATE 50 UG/ML
50 INJECTION, SOLUTION INTRAMUSCULAR; INTRAVENOUS EVERY 5 MIN PRN
Status: DISCONTINUED | OUTPATIENT
Start: 2025-04-01 | End: 2025-04-01 | Stop reason: HOSPADM

## 2025-04-01 RX ORDER — SODIUM CHLORIDE, SODIUM LACTATE, POTASSIUM CHLORIDE, CALCIUM CHLORIDE 600; 310; 30; 20 MG/100ML; MG/100ML; MG/100ML; MG/100ML
INJECTION, SOLUTION INTRAVENOUS
Status: DISCONTINUED | OUTPATIENT
Start: 2025-04-01 | End: 2025-04-01 | Stop reason: SDUPTHER

## 2025-04-01 RX ORDER — FENTANYL CITRATE 50 UG/ML
INJECTION, SOLUTION INTRAMUSCULAR; INTRAVENOUS
Status: DISCONTINUED | OUTPATIENT
Start: 2025-04-01 | End: 2025-04-01 | Stop reason: SDUPTHER

## 2025-04-01 RX ORDER — MIDAZOLAM HYDROCHLORIDE 1 MG/ML
INJECTION, SOLUTION INTRAMUSCULAR; INTRAVENOUS
Status: DISCONTINUED | OUTPATIENT
Start: 2025-04-01 | End: 2025-04-01 | Stop reason: SDUPTHER

## 2025-04-01 RX ORDER — LIDOCAINE HYDROCHLORIDE 20 MG/ML
INJECTION, SOLUTION EPIDURAL; INFILTRATION; INTRACAUDAL; PERINEURAL
Status: DISCONTINUED | OUTPATIENT
Start: 2025-04-01 | End: 2025-04-01 | Stop reason: SDUPTHER

## 2025-04-01 RX ORDER — SODIUM CHLORIDE, SODIUM LACTATE, POTASSIUM CHLORIDE, CALCIUM CHLORIDE 600; 310; 30; 20 MG/100ML; MG/100ML; MG/100ML; MG/100ML
INJECTION, SOLUTION INTRAVENOUS ONCE
Status: DISCONTINUED | OUTPATIENT
Start: 2025-04-01 | End: 2025-04-02 | Stop reason: HOSPADM

## 2025-04-01 RX ORDER — DEXTROSE MONOHYDRATE 100 MG/ML
INJECTION, SOLUTION INTRAVENOUS CONTINUOUS PRN
Status: DISCONTINUED | OUTPATIENT
Start: 2025-04-01 | End: 2025-04-01 | Stop reason: HOSPADM

## 2025-04-01 RX ORDER — KETOROLAC TROMETHAMINE 15 MG/ML
15 INJECTION, SOLUTION INTRAMUSCULAR; INTRAVENOUS EVERY 6 HOURS PRN
Status: DISCONTINUED | OUTPATIENT
Start: 2025-04-01 | End: 2025-04-02 | Stop reason: HOSPADM

## 2025-04-01 RX ORDER — SODIUM CHLORIDE 9 MG/ML
INJECTION, SOLUTION INTRAVENOUS ONCE
Status: DISCONTINUED | OUTPATIENT
Start: 2025-04-01 | End: 2025-04-02 | Stop reason: HOSPADM

## 2025-04-01 RX ORDER — NALOXONE HYDROCHLORIDE 0.4 MG/ML
INJECTION, SOLUTION INTRAMUSCULAR; INTRAVENOUS; SUBCUTANEOUS PRN
Status: DISCONTINUED | OUTPATIENT
Start: 2025-04-01 | End: 2025-04-01 | Stop reason: HOSPADM

## 2025-04-01 RX ORDER — ONDANSETRON 2 MG/ML
INJECTION INTRAMUSCULAR; INTRAVENOUS
Status: DISCONTINUED | OUTPATIENT
Start: 2025-04-01 | End: 2025-04-01 | Stop reason: SDUPTHER

## 2025-04-01 RX ORDER — DOCUSATE SODIUM 100 MG/1
100 CAPSULE, LIQUID FILLED ORAL 2 TIMES DAILY
Status: DISCONTINUED | OUTPATIENT
Start: 2025-04-01 | End: 2025-04-02 | Stop reason: HOSPADM

## 2025-04-01 RX ORDER — DIPHENHYDRAMINE HYDROCHLORIDE 50 MG/ML
12.5 INJECTION, SOLUTION INTRAMUSCULAR; INTRAVENOUS
Status: DISCONTINUED | OUTPATIENT
Start: 2025-04-01 | End: 2025-04-01 | Stop reason: HOSPADM

## 2025-04-01 RX ADMIN — MIDAZOLAM 2 MG: 1 INJECTION, SOLUTION INTRAMUSCULAR; INTRAVENOUS at 08:07

## 2025-04-01 RX ADMIN — KETOROLAC TROMETHAMINE 15 MG: 15 INJECTION, SOLUTION INTRAMUSCULAR; INTRAVENOUS at 20:57

## 2025-04-01 RX ADMIN — SODIUM CHLORIDE, PRESERVATIVE FREE 10 ML: 5 INJECTION INTRAVENOUS at 21:11

## 2025-04-01 RX ADMIN — TAMSULOSIN HYDROCHLORIDE 0.4 MG: 0.4 CAPSULE ORAL at 09:41

## 2025-04-01 RX ADMIN — SODIUM CHLORIDE, SODIUM LACTATE, POTASSIUM CHLORIDE, AND CALCIUM CHLORIDE: 600; 310; 30; 20 INJECTION, SOLUTION INTRAVENOUS at 08:07

## 2025-04-01 RX ADMIN — PHENYLEPHRINE HYDROCHLORIDE 100 MCG: 10 INJECTION INTRAVENOUS at 08:38

## 2025-04-01 RX ADMIN — DEXAMETHASONE SODIUM PHOSPHATE 4 MG: 4 INJECTION INTRA-ARTICULAR; INTRALESIONAL; INTRAMUSCULAR; INTRAVENOUS; SOFT TISSUE at 08:30

## 2025-04-01 RX ADMIN — DOCUSATE SODIUM 100 MG: 100 CAPSULE, LIQUID FILLED ORAL at 18:01

## 2025-04-01 RX ADMIN — CEFTRIAXONE 1000 MG: 1 INJECTION, POWDER, FOR SOLUTION INTRAMUSCULAR; INTRAVENOUS at 08:22

## 2025-04-01 RX ADMIN — ONDANSETRON 4 MG: 2 INJECTION INTRAMUSCULAR; INTRAVENOUS at 08:35

## 2025-04-01 RX ADMIN — LIDOCAINE HYDROCHLORIDE 60 MG: 20 INJECTION, SOLUTION EPIDURAL; INFILTRATION; INTRACAUDAL; PERINEURAL at 08:17

## 2025-04-01 RX ADMIN — FENTANYL CITRATE 50 MCG: 0.05 INJECTION, SOLUTION INTRAMUSCULAR; INTRAVENOUS at 09:26

## 2025-04-01 RX ADMIN — HYDROMORPHONE HYDROCHLORIDE 0.5 MG: 1 INJECTION, SOLUTION INTRAMUSCULAR; INTRAVENOUS; SUBCUTANEOUS at 05:04

## 2025-04-01 RX ADMIN — SODIUM CHLORIDE, PRESERVATIVE FREE 10 ML: 5 INJECTION INTRAVENOUS at 20:54

## 2025-04-01 RX ADMIN — PROPOFOL 120 MG: 10 INJECTION, EMULSION INTRAVENOUS at 08:17

## 2025-04-01 RX ADMIN — HYDROMORPHONE HYDROCHLORIDE 0.5 MG: 1 INJECTION, SOLUTION INTRAMUSCULAR; INTRAVENOUS; SUBCUTANEOUS at 01:33

## 2025-04-01 RX ADMIN — FENTANYL CITRATE 50 MCG: 50 INJECTION INTRAMUSCULAR; INTRAVENOUS at 08:27

## 2025-04-01 RX ADMIN — Medication 10 MG: at 08:13

## 2025-04-01 RX ADMIN — FENTANYL CITRATE 50 MCG: 50 INJECTION INTRAMUSCULAR; INTRAVENOUS at 08:17

## 2025-04-01 ASSESSMENT — PAIN DESCRIPTION - ORIENTATION
ORIENTATION: ANTERIOR
ORIENTATION: POSTERIOR
ORIENTATION: POSTERIOR;ANTERIOR
ORIENTATION: ANTERIOR
ORIENTATION: POSTERIOR
ORIENTATION: LOWER

## 2025-04-01 ASSESSMENT — PAIN SCALES - GENERAL
PAINLEVEL_OUTOF10: 0
PAINLEVEL_OUTOF10: 8
PAINLEVEL_OUTOF10: 10
PAINLEVEL_OUTOF10: 4
PAINLEVEL_OUTOF10: 4
PAINLEVEL_OUTOF10: 10
PAINLEVEL_OUTOF10: 4
PAINLEVEL_OUTOF10: 4
PAINLEVEL_OUTOF10: 6

## 2025-04-01 ASSESSMENT — PAIN DESCRIPTION - PAIN TYPE
TYPE: ACUTE PAIN
TYPE: ACUTE PAIN

## 2025-04-01 ASSESSMENT — PAIN DESCRIPTION - LOCATION
LOCATION: BACK
LOCATION: ABDOMEN
LOCATION: BACK
LOCATION: CHEST;BACK
LOCATION: ABDOMEN;BACK
LOCATION: CHEST;BACK

## 2025-04-01 ASSESSMENT — PAIN DESCRIPTION - DESCRIPTORS
DESCRIPTORS: ACHING
DESCRIPTORS: OTHER (COMMENT)
DESCRIPTORS: ACHING;DISCOMFORT
DESCRIPTORS: OTHER (COMMENT)
DESCRIPTORS: BURNING;SQUEEZING

## 2025-04-01 ASSESSMENT — PAIN - FUNCTIONAL ASSESSMENT
PAIN_FUNCTIONAL_ASSESSMENT: 0-10
PAIN_FUNCTIONAL_ASSESSMENT: ACTIVITIES ARE NOT PREVENTED
PAIN_FUNCTIONAL_ASSESSMENT: ACTIVITIES ARE NOT PREVENTED

## 2025-04-01 ASSESSMENT — PAIN DESCRIPTION - FREQUENCY: FREQUENCY: CONTINUOUS

## 2025-04-01 NOTE — CARE COORDINATION
Chart reviewed, met with pt at bedside to initiate cm assessment. HIPAA/Demographics verified. Pt would like assistance with finding a Pcp. Cm notified CMS via e-mail for assistance with finding Pcp for patient.no other needs at this time cm will remain available.   04/01/25 1840   Service Assessment   Patient Orientation Alert and Oriented   Cognition Alert   History Provided By Patient   Primary Caregiver Self   Support Systems Children   Patient's Healthcare Decision Maker is: Patient Declined (Legal Next of Kin Remains as Decision Maker)   PCP Verified by CM No  (pt states she doesn't have pcp and would like assistance finding one)   Prior Functional Level Independent in ADLs/IADLs   Current Functional Level Independent in ADLs/IADLs   Can patient return to prior living arrangement Yes   Ability to make needs known: Good   Family able to assist with home care needs: Yes   Would you like for me to discuss the discharge plan with any other family members/significant others, and if so, who? No   Financial Resources Medicaid   Community Resources None   CM/SW Referral No PCP   Social/Functional History   Lives With Daughter   Type of Home House   Home Layout One level   Home Access Stairs to enter with rails   Entrance Stairs - Number of Steps 2   Bathroom Shower/Tub Tub/Shower unit   Bathroom Toilet Standard   Bathroom Equipment None   Bathroom Accessibility Accessible   Home Equipment None   Receives Help From Family   Prior Level of Assist for ADLs Independent   Prior Level of Assist for Homemaking Independent   Ambulation Assistance Independent   Prior Level of Assist for Transfers Independent   Active  Yes   Mode of Transportation Car   Occupation Full time employment   Type of Occupation CNA   Discharge Planning   Type of Residence House   Living Arrangements Children   Current Services Prior To Admission None   Potential Assistance Needed N/A   DME Ordered? No   Potential Assistance Purchasing

## 2025-04-01 NOTE — PROGRESS NOTES
[facility] Hospitalist Group  Progress Note    Patient: Lara Montesinos Age: 60 y.o. : 1965 MR#: [id number] SSN: [id number]  Date/Time: 2025     Subjective:   Patient doing well. No pain complaints. Resting peacefully.    Dispo: Home  DC tomorrow    Review of systems  General: No fevers or chills.  Cardiovascular: No chest pain or pressure. No palpitations.   Pulmonary: No shortness of breath, cough or wheeze.   Gastrointestinal: No abdominal pain, nausea, vomiting or diarrhea.   Genitourinary: No urinary frequency, urgency, hesitancy or dysuria.   Musculoskeletal: No joint or muscle pain, no back pain, no recent trauma.    Neurologic: No headache, numbness, tingling or weakness.   Assessment/Plan:   Obstructive uropathy  Nephrolithiasis  Hydronephrosis   Left renal cyst  Sickle cell trait    Admit to 5s  S/p ureteral stent on 25 with urology  Follow up urine culture  Continue rocephin  Continue flomax  US planned for today to make sure hydronephrosis resolves.  Ying in place      I spent 40 minutes with the patient in face-to-face consultation, of which greater than 50% was spent in counseling and coordination of care as described above.    Case discussed with:  [x]Patient  []Family  []Nursing  []Case Management  DVT Prophylaxis:  []Lovenox  []Hep SQ  [x]SCDs  []Coumadin   []Eliquis/Xarelto     Objective:   VS: BP (!) 148/104 Comment: nurse notified  Pulse 68   Temp 97.5 °F (36.4 °C) (Oral)   Resp 18   Ht 1.575 m (5' 2\")   Wt 69.4 kg (153 lb)   SpO2 98%   BMI 27.98 kg/m²    Tmax/24hrs: Temp (24hrs), Av.1 °F (36.7 °C), Min:97.5 °F (36.4 °C), Max:98.6 °F (37 °C)  IOBRIEF  Intake/Output Summary (Last 24 hours) at 2025 1333  Last data filed at 2025 1258  Gross per 24 hour   Intake 600 ml   Output 1000 ml   Net -400 ml       General:  Alert, cooperative, no acute distress    HEENT: PERRLA, anicteric sclerae.  Pulmonary:  CTA Bilaterally. No

## 2025-04-01 NOTE — PROGRESS NOTES
[phone]    Subjective:     VSS. Afebrile. s/p cysto, Right retrograde pyelogram/ Right JJ stent placement. POD0. Patient tolerating right ureteral stent well. Denies flank pain at this time, denies bladder spasms. Feels constipated. Appetite good. Ying catheter draining clear, light red urine to gravity    Objective:     Vitals:    04/01/25 0944 04/01/25 0954 04/01/25 1003 04/01/25 1053   BP: (!) 162/81 (!) 166/79 (!) 155/90 (!) 148/104   Pulse: 71 73 70 68   Resp: 18 21 19 18   Temp:    97.5 °F (36.4 °C)   TempSrc:    Oral   SpO2: 100% 100% 100% 98%   Weight:       Height:            Intake and Output:  Current Shift: 04/01 0701 - 04/01 1900  In: -   Out: 800 [Urine:800]  Last three shifts: 03/30 1901 - 04/01 0700  In: 1600 [P.O.:600]  Out: 200 [Urine:200]    Current Facility-Administered Medications   Medication Dose Route Frequency    0.9 % sodium chloride infusion   IntraVENous Once    lactated ringers infusion   IntraVENous Once    ketorolac (TORADOL) injection 15 mg  15 mg IntraVENous Q6H PRN    midazolam PF (VERSED) injection 1 mg  1 mg IntraVENous Once    albuterol (PROVENTIL) (2.5 MG/3ML) 0.083% nebulizer solution 2.5 mg  2.5 mg Nebulization Q4H PRN    HYDROmorphone (DILAUDID) injection 0.25 mg  0.25 mg IntraVENous Q3H PRN    Or    HYDROmorphone (DILAUDID) injection 0.5 mg  0.5 mg IntraVENous Q3H PRN    tamsulosin (FLOMAX) capsule 0.4 mg  0.4 mg Oral Daily    sodium chloride flush 0.9 % injection 5-40 mL  5-40 mL IntraVENous 2 times per day    sodium chloride flush 0.9 % injection 5-40 mL  5-40 mL IntraVENous PRN    0.9 % sodium chloride infusion   IntraVENous PRN    potassium chloride (KLOR-CON M) extended release tablet 40 mEq  40 mEq Oral PRN    Or    potassium bicarb-citric acid (EFFER-K) effervescent tablet 40 mEq  40 mEq Oral PRN    Or    potassium chloride 10 mEq/100 mL IVPB (Peripheral Line)  10 mEq IntraVENous PRN    magnesium sulfate 2000 mg in 50 mL IVPB premix  2,000 mg IntraVENous PRN     ondansetron (ZOFRAN-ODT) disintegrating tablet 4 mg  4 mg Oral Q8H PRN    Or    ondansetron (ZOFRAN) injection 4 mg  4 mg IntraVENous Q8H PRN    polyethylene glycol (GLYCOLAX) packet 17 g  17 g Oral Daily PRN    acetaminophen (TYLENOL) tablet 650 mg  650 mg Oral Q8H PRN    Or    acetaminophen (TYLENOL) suppository 650 mg  650 mg Rectal Q8H PRN    cefTRIAXone (ROCEPHIN) 1,000 mg in sterile water 10 mL IV syringe  1,000 mg IntraVENous Q24H         Physical Exam:   GENERAL: alert, cooperative, no distress, appears stated age  LUNG: unlabored breathing  ABDOMEN: soft, non-tender. No masses,  no organomegaly  EXTREMITIES:  extremities normal, atraumatic, no cyanosis or edema  SKIN: no jaundice  : no CVA tenderness, valdez draining clear, light red urine to gravity        Principal Problem:    Ureterolithiasis  Active Problems:    Obstructive uropathy    Hypokalemia  Resolved Problems:    * No resolved hospital problems. *

## 2025-04-01 NOTE — ANESTHESIA POSTPROCEDURE EVALUATION
Department of Anesthesiology  Postprocedure Note    Patient: Lara Montesinos  MRN: [id number]  YOB: 1965  Date of evaluation: 4/1/2025    Procedure Summary       Date: 04/01/25 Room / Location: [facility] MAIN 08 / [facility] MA[facility] OR    Anesthesia Start: 0807 Anesthesia Stop: 0903    Procedure: Cystoscopy, right ureteroscopy, right ureteral stent placement and right retrograde pyelograms (Right: Ureter) Diagnosis:       Ureteral stone      (Ureteral stone [N20.1])    Surgeons: Steven Lucero MD Responsible Provider: Naldo Cedeno MD    Anesthesia Type: General ASA Status: 2            Anesthesia Type: General    Giles Phase I: Giles Score: 9    Giles Phase II:      Anesthesia Post Evaluation    Patient location during evaluation: bedside  Patient participation: complete - patient participated  Airway patency: patent  Cardiovascular status: hemodynamically stable  Respiratory status: acceptable  Hydration status: stable    No notable events documented.

## 2025-04-01 NOTE — PROGRESS NOTES
Comprehensive Nutrition Assessment    Type and Reason for Visit:  Initial, Positive nutrition screen    Nutrition Recommendations/Plan:   Continue NPO, advance diet as medically feasible.   Daily wts.   Continue to monitor readiness for diet advancement or nutrition support, weight, labs, and plan of care during admission.     Malnutrition Assessment:  Malnutrition Status:  Insufficient data (04/01/25 0905)    Context:  Acute Illness       Nutrition History and Allergies:      Past Medical History:   Diagnosis Date    Bronchitis     Bronchitis     Obesity     Respiratory abnormalities     Sickle cell trait    PMHx of multiple intra-abdominal surgeries, h/o cholecystectomy.     Pt presented with c/o abdominal pain x3 days PTA associated with N/V.     Weight Hx: per limited EMR review, wt change: -12 lb (7.3%) x 8 months - not significant.   Wt Readings from Last 10 Encounters:   04/01/25 69.4 kg (153 lb)   07/24/24 74.8 kg (165 lb)     NFPE: unable to perform NFPE. Food Allergies: NKFA    Nutrition Assessment:    Admitted for management of ureterolithiasis, obstructive uropathy. Pt seen for - Positive Nutrition Screen: MST- (14-23 lb) wt loss, NO decreased appetite. Pt currently HARRIETT for cystoscopy/ureteroscopy/ureteral stent placement; unable to speak to. Re-attempt to discuss nutrition hx at follow-up.    Nutrition Related Findings:    Pertinent Meds:  Reviewed.  Pertinent Labs:  Recent Labs     03/30/25  2132 03/31/25  0529 04/01/25  0505   GLUCOSE 134*  --  127*   BUN 11  --  6*   CREATININE 1.04  --  0.93     --  136   K 3.2*  --  4.3     --  106   CO2 29  --  22   CALCIUM 9.5  --  8.9   MG  --  2.0  --      No results for input(s): \"POCGLU\" in the last 72 hours.    Last BM: Last BM (including prior to admit): 03/27/25     Skin: Wound Type: None       Edema: Edema: None         Current Nutrition Intake & Therapies:    Average Meal Intake: NPO  Average Supplements Intake: NPO  Diet NPO Exceptions are:

## 2025-04-01 NOTE — PROGRESS NOTES
Chg wipes brought to bedside. Instructed on use and told when she does wipe herself to let me know so I can help with her back and if she needed any other assistance.

## 2025-04-01 NOTE — OP NOTE
Operative Note  Patient: Lara Montesinos               Sex: female             MRN: [id number]  YOB: 1965      Age:  60 y.o.              Preoperative Diagnosis: Ureteral stone [N20.1]  Postoperative Diagnosis:  Urinary tract infection, ureteral stone  Surgeon: Steven Lucero MD  Assistant Surgeon: Abdirahman Tiwari MD PGY-4    Indication: This is a 59 y/o presenting for operative management of right ureteral stone.  Prior to the procedure, the goals, alternatives, risks and benefits including but not limited to bleeding, infection, injury to the urethra, bladder, ureter, kidney, inability to remove all stones or access stones, likelihood of ureteral stenting and stent removal in a timely fashion (if placed) as well as anesthetic risks and expected postoperative urinary symptoms were discussed.  The patient understands and agrees to proceed as planned.    Procedure:    1) Cystoscopy  2) right retrograde pyelogram with interpretation  3) < 1 hour physician fluoroscopy imaging interpretation time  4) right ureteroscopy   5) right ureteral stent placement     Findings:    Obstructing proximal right ureteral stone.  Initial aspiration of renal pelvic fluid without concern for infection, however after arriving at stone, purulent fluid draining.  Ureteroscopy aborted and ureteral stent placed.    Narrative of events: The patient was brought to the operative suite.  SCD boots were applied and the machine tried.  Anesthesia was induced and preoperative antibiotics were administered. They were then placed in the dorsal lithotomy position and their external genitalia was prepped and draped in the usual fashion. A surgical timeout was performed confirming the patient's name, date of birth, laterality, and antibiotics. All were in agreement. A 22 [place] citizen cystourethroscope was then inserted into the patients bladder. The urethra revealed no abnormalities. Cystoscopy showed normal-appearing bladder with orthotopic  ureteral orifices.    Right retrograde pyelogram was performed using a 5 Beninese ureteral catheter, which showed an obstructing right proximal ureteral stone with moderate proximal hydronephrosis    The right ureteral orifice was cannulated with a 0.035 sensor tip wire and confirmed in the renal pelvis.  The ureteral catheter was advanced over the wire and wire removed.  About 5 cc of urine was aspirated.  It was dark yellow, but otherwise clear in appearance without any purulence.    A semirigid ureteroscope was passed into the bladder and subsequently into the ureteral orifice and advanced to the stone which was encountered in the right proximal ureter.  After arriving of the stone, prior to passing lasering, there was purulent drainage of fluid.  This was additionally aspirated and added to the original culture.  Given the concern for infection, the decision was made to abort the ureteroscopy.  The ureteroscope was removed without evidence of ureteral injury. A 6 x 26 double-J stent was then passed over the wire with good curl in the renal pelvis and in the bladder using fluoroscopic guidance.  The cystoscope was repassed into the bladder and cloudy fluid was seen draining from the stent.  The cystoscope was removed and a 16 Beninese Ying catheter placed. This concluded the procedure.  The patient was awakened from anesthesia and brought to the PACU in stable condition having tolerated the procedure well.    Estimated Blood Loss: <5CC     Anesthesia:  General                  Implants:   Implant Name Type Inv. Item Serial No.  Lot No. LRB No. Used Action   STENT URETERAL [id number] SOFT MONOFILAMENT TRIA - [id number]  STENT URETERAL [id number] SOFT MONOFILAMENT TRIA  [employer] [facility]- [id number] Right 1 Implanted       Specimens:   ID Type Source Tests Collected by Time Destination   1 : RIGHT KIDNEY URINE Urine Kidney CULTURE, URINE Steven Lucero MD 4/1/2025  8:36 AM         Drains: 16 Beninese Ying

## 2025-04-01 NOTE — ANESTHESIA PRE PROCEDURE
Department of Anesthesiology  Preprocedure Note       Name:  Lara Montesinos   Age:  60 y.o.  :  1965                                          MRN:  [id number]         Date:  2025      Surgeon: Surgeon(s):  Steven Lucero MD    Procedure: Procedure(s):  CYSTOSCOPY; RIGHT RETROGRADE PYELOGRAM; RIGHT STENT PLACEMENT; C-ARM    Medications prior to admission:   Prior to Admission medications    Medication Sig Start Date End Date Taking? Authorizing Provider   ketorolac (TORADOL) 10 MG tablet Take 1 tablet by mouth every 6 hours as needed for Pain 25  Yes Nitin Palmer Jr., DO   Magic Mouthwash (MIRACLE MOUTHWASH) Swish and spit 5 mLs 4 times daily as needed for Irritation 25  Yes Nitin Palmer Jr., DO   cetirizine (ZYRTEC) 10 MG tablet Take 1 tablet by mouth daily 24   Laila Arreguin PA-C   albuterol sulfate HFA (PROVENTIL;VENTOLIN;PROAIR) 108 (90 Base) MCG/ACT inhaler 2 puffs every 4 hours prn 13   Automatic Reconciliation, Ar   estrogens conjugated (PREMARIN) 0.625 MG/GM CREA vaginal cream Place 0.5 g vaginally daily 22   Automatic Reconciliation, Ar       Current medications:    Current Facility-Administered Medications   Medication Dose Route Frequency Provider Last Rate Last Admin   • 0.9 % sodium chloride infusion   IntraVENous Once Steven Lucero MD       • lactated ringers infusion   IntraVENous Once Steven Lucero MD       • albuterol (PROVENTIL) (2.5 MG/3ML) 0.083% nebulizer solution 2.5 mg  2.5 mg Nebulization Q4H PRN Miguel Jane MD       • HYDROmorphone (DILAUDID) injection 0.25 mg  0.25 mg IntraVENous Q3H PRN Miguel Jane MD        Or   • HYDROmorphone (DILAUDID) injection 0.5 mg  0.5 mg IntraVENous Q3H PRN Miguel Jane MD   0.5 mg at 25 0504   • tamsulosin (FLOMAX) capsule 0.4 mg  0.4 mg Oral Daily Miguel Jane MD   0.4 mg at 25 0649   • sodium chloride flush 0.9 % injection 5-40 mL  5-40 mL IntraVENous 2 times per day

## 2025-04-01 NOTE — PROGRESS NOTES
Patient completed chg wipes, completed pre-op check list. Patient left for pre-op. Status unchanged, no s/s of distress or discomfort.

## 2025-04-02 ENCOUNTER — APPOINTMENT (OUTPATIENT)
Facility: HOSPITAL | Age: 60
DRG: 465 | End: 2025-04-02
Payer: MEDICAID

## 2025-04-02 VITALS
RESPIRATION RATE: 17 BRPM | BODY MASS INDEX: 28.97 KG/M2 | OXYGEN SATURATION: 98 % | WEIGHT: 157.4 LBS | DIASTOLIC BLOOD PRESSURE: 90 MMHG | HEART RATE: 58 BPM | SYSTOLIC BLOOD PRESSURE: 125 MMHG | HEIGHT: 62 IN | TEMPERATURE: 98.2 F

## 2025-04-02 LAB
ALBUMIN SERPL-MCNC: 2.9 G/DL (ref 3.4–5)
ALBUMIN/GLOB SERPL: 0.7 (ref 0.8–1.7)
ALP SERPL-CCNC: 72 U/L (ref 45–117)
ALT SERPL-CCNC: 23 U/L (ref 13–56)
ANION GAP SERPL CALC-SCNC: 8 MMOL/L (ref 3–18)
AST SERPL-CCNC: 10 U/L (ref 10–38)
BASOPHILS # BLD: 0.06 K/UL (ref 0–0.1)
BASOPHILS NFR BLD: 0.6 % (ref 0–2)
BILIRUB SERPL-MCNC: 0.8 MG/DL (ref 0.2–1)
BUN SERPL-MCNC: 7 MG/DL (ref 7–18)
BUN/CREAT SERPL: 10 (ref 12–20)
CALCIUM SERPL-MCNC: 9.1 MG/DL (ref 8.5–10.1)
CHLORIDE SERPL-SCNC: 104 MMOL/L (ref 100–111)
CO2 SERPL-SCNC: 25 MMOL/L (ref 21–32)
CREAT SERPL-MCNC: 0.73 MG/DL (ref 0.6–1.3)
DIFFERENTIAL METHOD BLD: ABNORMAL
EOSINOPHIL # BLD: 0.18 K/UL (ref 0–0.4)
EOSINOPHIL NFR BLD: 1.9 % (ref 0–5)
ERYTHROCYTE [DISTWIDTH] IN BLOOD BY AUTOMATED COUNT: 14.1 % (ref 11.6–14.5)
GLOBULIN SER CALC-MCNC: 3.9 G/DL (ref 2–4)
GLUCOSE SERPL-MCNC: 122 MG/DL (ref 74–99)
HCT VFR BLD AUTO: 39.6 % (ref 35–45)
HGB BLD-MCNC: 13 G/DL (ref 12–16)
IMM GRANULOCYTES # BLD AUTO: 0.04 K/UL (ref 0–0.04)
IMM GRANULOCYTES NFR BLD AUTO: 0.4 % (ref 0–0.5)
LYMPHOCYTES # BLD: 2.59 K/UL (ref 0.9–3.6)
LYMPHOCYTES NFR BLD: 26.8 % (ref 21–52)
MCH RBC QN AUTO: 25.6 PG (ref 24–34)
MCHC RBC AUTO-ENTMCNC: 32.8 G/DL (ref 31–37)
MCV RBC AUTO: 78.1 FL (ref 78–100)
MONOCYTES # BLD: 0.95 K/UL (ref 0.05–1.2)
MONOCYTES NFR BLD: 9.8 % (ref 3–10)
NEUTS SEG # BLD: 5.83 K/UL (ref 1.8–8)
NEUTS SEG NFR BLD: 60.5 % (ref 40–73)
NRBC # BLD: 0 K/UL (ref 0–0.01)
NRBC BLD-RTO: 0 PER 100 WBC
PLATELET # BLD AUTO: 229 K/UL (ref 135–420)
PMV BLD AUTO: 12.4 FL (ref 9.2–11.8)
POTASSIUM SERPL-SCNC: 3.6 MMOL/L (ref 3.5–5.5)
PROT SERPL-MCNC: 6.8 G/DL (ref 6.4–8.2)
RBC # BLD AUTO: 5.07 M/UL (ref 4.2–5.3)
SODIUM SERPL-SCNC: 137 MMOL/L (ref 136–145)
WBC # BLD AUTO: 9.7 K/UL (ref 4.6–13.2)

## 2025-04-02 PROCEDURE — 2500000003 HC RX 250 WO HCPCS: Performed by: INTERNAL MEDICINE

## 2025-04-02 PROCEDURE — 6370000000 HC RX 637 (ALT 250 FOR IP): Performed by: INTERNAL MEDICINE

## 2025-04-02 PROCEDURE — 6360000002 HC RX W HCPCS: Performed by: INTERNAL MEDICINE

## 2025-04-02 PROCEDURE — 36415 COLL VENOUS BLD VENIPUNCTURE: CPT

## 2025-04-02 PROCEDURE — 76770 US EXAM ABDO BACK WALL COMP: CPT

## 2025-04-02 PROCEDURE — 99239 HOSP IP/OBS DSCHRG MGMT >30: CPT | Performed by: INTERNAL MEDICINE

## 2025-04-02 PROCEDURE — 85025 COMPLETE CBC W/AUTO DIFF WBC: CPT

## 2025-04-02 PROCEDURE — 94760 N-INVAS EAR/PLS OXIMETRY 1: CPT

## 2025-04-02 PROCEDURE — 6370000000 HC RX 637 (ALT 250 FOR IP)

## 2025-04-02 PROCEDURE — 80053 COMPREHEN METABOLIC PANEL: CPT

## 2025-04-02 RX ORDER — LEVOFLOXACIN 500 MG/1
500 TABLET, FILM COATED ORAL DAILY
Qty: 10 TABLET | Refills: 0 | Status: SHIPPED | OUTPATIENT
Start: 2025-04-02 | End: 2025-04-12

## 2025-04-02 RX ORDER — LACTULOSE 10 G/15ML
20 SOLUTION ORAL 3 TIMES DAILY
Qty: 237 ML | Refills: 1 | Status: SHIPPED | OUTPATIENT
Start: 2025-04-02

## 2025-04-02 RX ORDER — TAMSULOSIN HYDROCHLORIDE 0.4 MG/1
0.4 CAPSULE ORAL DAILY
Qty: 30 CAPSULE | Refills: 3 | Status: SHIPPED | OUTPATIENT
Start: 2025-04-03

## 2025-04-02 RX ORDER — OXYCODONE AND ACETAMINOPHEN 5; 325 MG/1; MG/1
1 TABLET ORAL EVERY 8 HOURS PRN
Qty: 9 TABLET | Refills: 0 | Status: SHIPPED | OUTPATIENT
Start: 2025-04-02 | End: 2025-04-05

## 2025-04-02 RX ORDER — PSEUDOEPHEDRINE HCL 30 MG
100 TABLET ORAL 2 TIMES DAILY
Qty: 60 CAPSULE | Refills: 0 | Status: SHIPPED | OUTPATIENT
Start: 2025-04-02

## 2025-04-02 RX ORDER — LACTULOSE 10 G/15ML
20 SOLUTION ORAL 3 TIMES DAILY
Status: DISCONTINUED | OUTPATIENT
Start: 2025-04-02 | End: 2025-04-02 | Stop reason: HOSPADM

## 2025-04-02 RX ADMIN — POLYETHYLENE GLYCOL 3350 17 G: 17 POWDER, FOR SOLUTION ORAL at 08:43

## 2025-04-02 RX ADMIN — HYDROMORPHONE HYDROCHLORIDE 0.5 MG: 1 INJECTION, SOLUTION INTRAMUSCULAR; INTRAVENOUS; SUBCUTANEOUS at 04:48

## 2025-04-02 RX ADMIN — CEFTRIAXONE 1000 MG: 1 INJECTION, POWDER, FOR SOLUTION INTRAMUSCULAR; INTRAVENOUS at 06:18

## 2025-04-02 RX ADMIN — ONDANSETRON 4 MG: 2 INJECTION, SOLUTION INTRAMUSCULAR; INTRAVENOUS at 04:59

## 2025-04-02 RX ADMIN — SODIUM CHLORIDE, PRESERVATIVE FREE 10 ML: 5 INJECTION INTRAVENOUS at 08:45

## 2025-04-02 RX ADMIN — DOCUSATE SODIUM 100 MG: 100 CAPSULE, LIQUID FILLED ORAL at 08:42

## 2025-04-02 RX ADMIN — TAMSULOSIN HYDROCHLORIDE 0.4 MG: 0.4 CAPSULE ORAL at 08:42

## 2025-04-02 ASSESSMENT — PAIN DESCRIPTION - DESCRIPTORS: DESCRIPTORS: ACHING;SORE

## 2025-04-02 ASSESSMENT — PAIN SCALES - GENERAL
PAINLEVEL_OUTOF10: 8
PAINLEVEL_OUTOF10: 0
PAINLEVEL_OUTOF10: 0
PAINLEVEL_OUTOF10: 3
PAINLEVEL_OUTOF10: 0

## 2025-04-02 ASSESSMENT — PAIN DESCRIPTION - ORIENTATION: ORIENTATION: RIGHT

## 2025-04-02 ASSESSMENT — PAIN - FUNCTIONAL ASSESSMENT: PAIN_FUNCTIONAL_ASSESSMENT: ACTIVITIES ARE NOT PREVENTED

## 2025-04-02 ASSESSMENT — PAIN DESCRIPTION - LOCATION: LOCATION: FLANK

## 2025-04-02 NOTE — ADDENDUM NOTE
Addendum  created 04/02/25 1330 by Vaishnavi Hartmann APRN - CRNA    Flowsheet accepted, Intraprocedure Meds edited

## 2025-04-02 NOTE — PROGRESS NOTES
Pt is alert and oriented and able to make needs known. No s/s of any pain or discomfort. Discharge instructions reviewed in full detail with the patient. Opportunity given for questions and answers provided.  All belongings are with the patient. Pt was wheeled down in  the wheelchair.  Pt is discharged in stable condition. Iv was removed an intact.

## 2025-04-02 NOTE — PROGRESS NOTES
Progress Note      Patient: Lara Montesinos MRN: [id number]  SSN: [id number]    YOB: 1965  Age: 60 y.o.  Sex: female      Admit Date: 3/30/2025    LOS: 2 days     Assessment:   1. Right flank pain 2/2 8 mm right proximal to mid ureteral stone              WBC: 9.7<12.5              sCr: 0.73<1.04               UA 3/30/25: neg blood, neg LE, neg nitrites              Ucx 4/1/25: no growth              CT A/P 3/30/25: 8 x 7 mm stone in the right proximal ureter with  post-obstructive changes, i.e. hydronephrosis and delayed nephrogram.   S/p cysto, Right retrograde pyelogram/ Right JJ stent placement  Findings: Obstructing proximal right ureteral stone. Initial aspiration of renal   pelvic fluid without concern for infection, however after arriving at stone,   purulent fluid draining. Ureteroscopy aborted and ureteral stent placed.      2. Left renal cyst  12 x 9 mm left renal cyst noted on CT scan 3/30/25           Plan:     -s/p cysto, Right retrograde pyelogram/ Right JJ stent placement  -Findings: Obstructing proximal right ureteral stone. Initial aspiration of renal pelvic fluid without concern for infection, however after arriving at stone, purulent fluid draining. Ureteroscopy aborted and ureteral stent placed.   -trend labs  -Ucx 4/1/25 showing no growth thus far  -Continue abx pending cultures, can transition to oral given noted purulent urine intra-op despite no growth on urine culture  -continue Flomax 0.4 mg daily  -Toradol and percocet prn for ureteral stent discomfort  -Trospium prn for bladder spasms associated with ureteral stent  -I explained to patient flank pain, hematuria, and bladder spasms are to be expected with ureteral stent.   -Ying removed  -Will need outpatient definitive stone treatment once infection is treated  -Urology signed off    Follow up: outpatient definitive stone treatment once infection is treated. Stone pathway sent to [facility].    Signed By: Brianna FORTUNE

## 2025-04-02 NOTE — PROGRESS NOTES
Attempted to help give pt CHG bath, pt stated she just wanted to sleep right now and will complete bath later.

## 2025-04-02 NOTE — CONSULTS
diabetic foot ulcers, lymphedema, wounds  Endocrine: polyuria, polydipsia, hair loss  Psych: anxiousness, prior substance abuse, suicidal ideation, depressed mood.  Heme-Onc: prior DVT, easy bruising, fatigue, malignancy        Objective:      /73   Pulse 60   Temp 97.9 °F (36.6 °C) (Oral)   Resp 18   Ht 1.575 m (5' 2\")   Wt 71.4 kg (157 lb 6.4 oz)   SpO2 99%   BMI 28.79 kg/m²   Temp (24hrs), Av.6 °F (37 °C), Min:97.9 °F (36.6 °C), Max:99.1 °F (37.3 °C)        General:   awake alert and oriented, appears stated age   Skin:   no rashes or skin lesions noted on limited exam, no jaundice   HEENT:  Normocephalic, atraumatic, PERRL, EOMI, no scleral icterus; no conjunctival hemmohage;    Lymph Nodes:   no cervical or inguinal adenopathy   Lungs:   non-labored, bilaterally clear to aspiration   Heart:  RRR, s1 and s2; no murmurs, no edema, + pedal pulses   Abdomen:  soft, non-distended, active bowel sounds. Non-tender   Genitourinary:  deferred   Extremities:   no clubbing, cyanosis; no joint effusions or swelling; muscle mass appropriate for age   Neurologic:  No gross focal sensory abnormalities; equal muscle strength to upper and lower extremities. Speech appropirate. Cranial nerves intact   Psychiatric:   appropriate and interactive.       Labs: Results:   Chemistry Recent Labs     252 25  0505 25  0448    136 137   K 3.2* 4.3 3.6    106 104   CO2 29 22 25   BUN 11 6* 7   GLOB 4.0 4.0 3.9      CBC w/Diff Recent Labs     252 25  0448   WBC 12.5 9.7   RBC 5.70* 5.07   HGB 14.6 13.0   HCT 44.2 39.6    229            Lab Results   Component Value Date/Time    SDES URINE 2013 02:29 PM    No components found for: \"CULT\"     Results       Procedure Component Value Units Date/Time    Culture, Urine [[id number]] Collected: 25 0836    Order Status: Completed Specimen: Urine Updated: 25 0857     Special Requests NO SPECIAL REQUESTS         Culture NO GROWTH 1 DAY                     Imaging:     All available imaging since presentation reviewed as per EPIC

## 2025-04-02 NOTE — PLAN OF CARE
Problem: Pain  Goal: Verbalizes/displays adequate comfort level or baseline comfort level  Outcome: Progressing  Flowsheets (Taken 3/31/2025 0425 by Gala Hooks RN)  Verbalizes/displays adequate comfort level or baseline comfort level:   Encourage patient to monitor pain and request assistance   Assess pain using appropriate pain scale  Note: Will manage and treat any pain. No pain reported. Will offer non pharmacological interventions first. Pain  is controlled.     Problem: Safety - Adult  Goal: Free from fall injury  Outcome: Progressing  Note: PT WAS EDUCATED ON HOW TO STAY SAFE AND TO CALL AN NOT ALL. SHE VERBALIZED UNDERSTANDING.      Problem: Safety - Adult  Goal: Free from fall injury  Outcome: Progressing  Note: PT WAS EDUCATED ON HOW TO STAY SAFE AND TO CALL AN NOT ALL. SHE VERBALIZED UNDERSTANDING.

## 2025-04-02 NOTE — CARE COORDINATION
D/C order noted for today. Orders reviewed. No needs identified at this time. CM remains available if needed. MSW provided patient with a [place] brochure to f/u for a PCP as she did not want to wait for ICM to assist. Pt aware and agreeable to making an appt.     ADRIAN Treviño   Inpatient Case Management (ICM)   [facility]

## 2025-04-02 NOTE — PROGRESS NOTES
Bedside and Verbal shift change report given to PAULO Juárez (oncoming nurse) by PAULO March (offgoing nurse). Report included the following information Nurse Handoff Report, Index, ED Encounter Summary, ED SBAR, Adult Overview, Surgery Report, Intake/Output, MAR, Recent Results, and Med Rec Status.

## 2025-04-02 NOTE — PLAN OF CARE
Problem: Pain  Goal: Verbalizes/displays adequate comfort level or baseline comfort level  Outcome: Progressing  Flowsheets (Taken 3/31/2025 0425 by Gala Hooks, RN)  Verbalizes/displays adequate comfort level or baseline comfort level:   Encourage patient to monitor pain and request assistance   Assess pain using appropriate pain scale  Note: PT IS HERE FOR KIDNEY STONES . PAIN IS WELL CONTROLLED. WILL CONTINUE TO MONITOR AND MANAGE PAIN PRN      Problem: Nutrition Deficit:  Goal: Optimize nutritional status  Outcome: Progressing  Note: EDUCATED ON EATING A HEALTH DIET FOR HEALING PURPOSES. PT VERBALIZED UNDERSTANDING

## 2025-04-03 LAB
BACTERIA SPEC CULT: NORMAL
SERVICE CMNT-IMP: NORMAL

## 2025-04-03 NOTE — CARE COORDINATION
Tried to contact patient at [phone] to schedule a PCP appointment, but there was not a dial tone, nor would the phone ring using that number.  Could not schedule a PCP appointment without contacting the patient.

## 2025-04-14 NOTE — PROGRESS NOTES
Physician Progress Note      PATIENT:               RICHY JOSHI  [facility] #:                  [id number]  :                       1965  ADMIT DATE:       3/30/2025 8:52 PM  DISCH DATE:        2025 1:18 PM  RESPONDING  PROVIDER #:        Familia Ko DO          QUERY TEXT:    UTI is documented in the medical record  OP note. Please provide additional   clinical indicators supportive of your documentation. Or please document if   the diagnosis of UTI has been ruled out after study.    The clinical indicators include:  H&P 3/31  Given leukocytosis with left shift -will start on antibiotic therapy though no   clear evidence of infection at this time    OP note   Postoperative Diagnosis:  Urinary tract infection, ureteral stone  Findings:  Obstructing proximal right ureteral stone.  Initial aspiration of renal pelvic   fluid without concern for infection, however after arriving at stone,   purulent fluid draining.  Ureteroscopy aborted and ureteral stent placed    ID consult   Pyuria-intraoperative finding.    UA 25 23:38  Color, UA: YELLOW  Glucose, Ur: Negative  Bilirubin, Urine: Negative  Ketones, Urine: Negative  Specific Gravity, UA: >1.030 (H)  Blood, Urine: Negative  Protein, UA: Negative  Urobilinogen: 1.0  Nitrite, Urine: Negative  Leukocyte Esterase, Urine: Negative  Appearance: CLEAR  pH, Urine: 6.0    urine cx   NO GROWTH 2 DAYS      0.9 % sodium chloride infusion  Rate: 50 mL/hr  cefTRIAXone (ROCEPHIN) 1,000 mg in sterile water 10 mL IV syringe  sodium chloride 0.9 % bolus 1,000 mL  Options provided:  -- UTI was ruled out  -- UTI present as evidenced by, Please document evidence.  -- Other - I will add my own diagnosis  -- Disagree - Not applicable / Not valid  -- Disagree - Clinically unable to determine / Unknown  -- Refer to Clinical Documentation Reviewer    PROVIDER RESPONSE TEXT:    UTI was ruled out after study.    Query created by: Brenda Arteaga on 2025 10:03

## 2025-06-13 ENCOUNTER — HOSPITAL ENCOUNTER (EMERGENCY)
Facility: HOSPITAL | Age: 60
Discharge: HOME | End: 2025-06-13
Payer: MEDICAID

## 2025-06-13 VITALS
TEMPERATURE: 98.5 F | WEIGHT: 135 LBS | BODY MASS INDEX: 24.84 KG/M2 | HEIGHT: 62 IN | DIASTOLIC BLOOD PRESSURE: 86 MMHG | HEART RATE: 90 BPM | SYSTOLIC BLOOD PRESSURE: 139 MMHG | OXYGEN SATURATION: 97 % | RESPIRATION RATE: 16 BRPM

## 2025-06-13 DIAGNOSIS — K08.89 PAIN, DENTAL: Primary | ICD-10-CM

## 2025-06-13 PROCEDURE — 6370000000 HC RX 637 (ALT 250 FOR IP): Performed by: PHYSICIAN ASSISTANT

## 2025-06-13 PROCEDURE — 99283 EMERGENCY DEPT VISIT LOW MDM: CPT

## 2025-06-13 RX ORDER — LIDOCAINE HYDROCHLORIDE 20 MG/ML
15 SOLUTION OROPHARYNGEAL
Status: COMPLETED | OUTPATIENT
Start: 2025-06-13 | End: 2025-06-13

## 2025-06-13 RX ORDER — LIDOCAINE HYDROCHLORIDE 20 MG/ML
15 SOLUTION OROPHARYNGEAL EVERY 6 HOURS
Qty: 100 ML | Refills: 0 | Status: SHIPPED | OUTPATIENT
Start: 2025-06-13

## 2025-06-13 RX ORDER — ACETAMINOPHEN 500 MG
1000 TABLET ORAL EVERY 6 HOURS PRN
Qty: 56 TABLET | Refills: 0 | Status: SHIPPED | OUTPATIENT
Start: 2025-06-13 | End: 2025-06-20

## 2025-06-13 RX ADMIN — AMOXICILLIN AND CLAVULANATE POTASSIUM 1 TABLET: 875; 125 TABLET, FILM COATED ORAL at 09:30

## 2025-06-13 RX ADMIN — LIDOCAINE HYDROCHLORIDE 15 ML: 20 SOLUTION ORAL at 09:31

## 2025-06-13 ASSESSMENT — PAIN SCALES - GENERAL: PAINLEVEL_OUTOF10: 10

## 2025-06-13 ASSESSMENT — PAIN - FUNCTIONAL ASSESSMENT: PAIN_FUNCTIONAL_ASSESSMENT: 0-10

## 2025-06-13 ASSESSMENT — PAIN DESCRIPTION - DESCRIPTORS: DESCRIPTORS: ACHING

## 2025-06-13 ASSESSMENT — PAIN DESCRIPTION - PAIN TYPE: TYPE: ACUTE PAIN

## 2025-06-13 ASSESSMENT — PAIN DESCRIPTION - ORIENTATION: ORIENTATION: LEFT;UPPER

## 2025-06-13 ASSESSMENT — PAIN DESCRIPTION - LOCATION: LOCATION: TEETH

## 2025-06-13 NOTE — ED PROVIDER NOTES
EMERGENCY DEPARTMENT HISTORY AND PHYSICAL EXAM      Date: 6/13/2025  Patient Name: Lara Montesinos    History of Presenting Illness     Chief Complaint   Patient presents with    Dental Pain       History (Context): Lara Montesinos is a 60 y.o. female  who presents to the ED today with chief complaint of left upper dental pain x 2 days.  Patient notes she has taken over-the-counter medication for symptoms without relief.  Patient notes that she has outpatient follow-up with a dentist on 7/1.  Patient denies fever or chills, facial swelling, drooling or stridor, nausea or vomiting.      PCP: No primary care provider on file.    No current facility-administered medications for this encounter.     Current Outpatient Medications   Medication Sig Dispense Refill    amoxicillin-clavulanate (AUGMENTIN) 875-125 MG per tablet Take 1 tablet by mouth 2 times daily for 7 days 14 tablet 0    lidocaine viscous hcl (XYLOCAINE) 2 % SOLN solution Take 15 mLs by mouth every 6 hours 100 mL 0    acetaminophen (TYLENOL) 500 MG tablet Take 2 tablets by mouth every 6 hours as needed for Pain 56 tablet 0    tolterodine (DETROL LA) 4 MG extended release capsule Take 1 capsule by mouth daily      tamsulosin (FLOMAX) 0.4 MG capsule Take 1 capsule by mouth daily 30 capsule 3    solifenacin (VESICARE) 5 MG tablet Take 1 tablet by mouth daily 30 tablet 0    cetirizine (ZYRTEC) 10 MG tablet Take 1 tablet by mouth daily 30 tablet 0    albuterol sulfate HFA (PROVENTIL;VENTOLIN;PROAIR) 108 (90 Base) MCG/ACT inhaler 2 puffs every 4 hours prn         Past History     Past Medical History:   Past Medical History:   Diagnosis Date    Bronchitis     Bronchitis     Obesity     Respiratory abnormalities     Sickle cell trait        Past Surgical History:  Past Surgical History:   Procedure Laterality Date    CHOLECYSTECTOMY      CYSTOSCOPY Right 04/01/2025    Cystoscopy, right ureteroscopy, right ureteral stent placement and right retrograde pyelograms  Motor: No weakness.         Diagnostic Study Results     Labs -   No results found for this or any previous visit (from the past 12 hours). Labs Reviewed - No data to display    Radiologic Studies -   No orders to display         The laboratory results, imaging results, and other diagnostic exams were reviewed in the EMR.    Medical Decision Making   I am the first provider for this patient.    I reviewed the vital signs, available nursing notes, past medical history, past surgical history, family history and social history.    Vital Signs-Reviewed the patient's vital signs.       Records Reviewed: Personally, on initial evaluation    MDM:   DDX includes but is not limited to: Dental caries, dental abscess, gingivitis    Patient overall well-appearing, in no acute distress, and vital signs grossly within normal limits upon arrival to the emergency department.            Orders as below:  No orders of the defined types were placed in this encounter.         ED Course:   ED Course as of 06/13/25 0939   Fri Jun 13, 2025   0911 Initial assessment of patient complete.    Patient is afebrile, nontoxic-appearing, looks well.  No facial swelling, drooling or stridor.  No evidence of dental abscess.    Patient is stable for discharge with antibiotics, symptomatic management, close outpatient follow-up with dentist.  Strict return precautions provided.  [DP]      ED Course User Index  [DP] Vicki Zelaya PA           Is this patient to be included in the SEP-1 core measure? No Exclusion criteria - the patient is NOT to be included for SEP-1 Core Measure due to: Infection is not suspected      Procedures:  Procedures        Social Determinants of Health: No PCP       Supplemental Historians include: none       Documentation/Prior Results Review:  Old medical records.  Nursing notes.  Previous radiology studies.      Discussion of Mangement with other Physicians, QHP or Appropriate Source:  none          Diagnosis and

## 2025-06-13 NOTE — DISCHARGE INSTRUCTIONS
Follow-up with one of the provided dental clinics below:    [facility] [phone]  [facility] -Extraction [phone]  [facility] [phone], [phone]  [facility] [phone]  [facility] [phone]  [facility] [phone]  [facility] [phone], [phone]      Call to schedule an appointment.

## (undated) DEVICE — SOLUTION IRRIG 1000ML 0.9% SOD CHL USP POUR PLAS BTL

## (undated) DEVICE — SYRINGE MED 30ML STD CLR PLAS LUERLOCK TIP N CTRL DISP

## (undated) DEVICE — STER SINGLE BASIN SET W/BOWLS: Brand: CARDINAL HEALTH

## (undated) DEVICE — GOWN,PREVENTION PLUS,XLN/XL,ST,24/CS: Brand: MEDLINE

## (undated) DEVICE — BAG DRAIN UROLOGY W/HOSE

## (undated) DEVICE — CYSTO PACK MARYVIEW: Brand: MEDLINE INDUSTRIES, INC.

## (undated) DEVICE — SOLUTION IRRIG 3000ML 0.9% SOD CHL FLX CONT 0797208] ICU MEDICAL INC]

## (undated) DEVICE — Y-TYPE TUR/BLADDER IRRIGATION SET, REGULATING CLAMP

## (undated) DEVICE — Z DISCONTINUED NO SUB IDED TRAY PREP DRY W/ PREM GLV 2 APPL 6 SPNG 2 UNDPD 1 OVERWRAP

## (undated) DEVICE — GAUZE,SPONGE,4"X4",16PLY,STRL,LF,10/TRAY: Brand: MEDLINE

## (undated) DEVICE — 1LYRTR 16FR10ML100%SILI UMSNAP: Brand: MEDLINE INDUSTRIES, INC.

## (undated) DEVICE — NITINOL WIRE WITH HYDROPHILIC TIP: Brand: SENSOR

## (undated) DEVICE — TUBING, SUCTION, 3/16" X 12', STRAIGHT: Brand: MEDLINE

## (undated) DEVICE — SYRINGE MED 10ML LUERLOCK TIP W/O SFTY DISP

## (undated) DEVICE — CLEAN UP KIT: Brand: MEDLINE INDUSTRIES, INC.

## (undated) DEVICE — GUIDEWIRE ENDOSCP L150CM DIA0.035IN TIP 3CM PTFE NIT

## (undated) DEVICE — JELLY LUBRICATING 2.7GM H2O SOL GREASELESS E-Z